# Patient Record
Sex: FEMALE | Race: WHITE | Employment: FULL TIME | URBAN - METROPOLITAN AREA
[De-identification: names, ages, dates, MRNs, and addresses within clinical notes are randomized per-mention and may not be internally consistent; named-entity substitution may affect disease eponyms.]

---

## 2019-07-11 ENCOUNTER — OFFICE VISIT (OUTPATIENT)
Dept: OBGYN CLINIC | Age: 44
End: 2019-07-11
Payer: COMMERCIAL

## 2019-07-11 VITALS
SYSTOLIC BLOOD PRESSURE: 102 MMHG | BODY MASS INDEX: 30.65 KG/M2 | HEIGHT: 63 IN | HEART RATE: 72 BPM | DIASTOLIC BLOOD PRESSURE: 74 MMHG | WEIGHT: 173 LBS

## 2019-07-11 DIAGNOSIS — N80.9 ENDOMETRIOSIS: Primary | ICD-10-CM

## 2019-07-11 PROCEDURE — G8417 CALC BMI ABV UP PARAM F/U: HCPCS | Performed by: OBSTETRICS & GYNECOLOGY

## 2019-07-11 PROCEDURE — 1036F TOBACCO NON-USER: CPT | Performed by: OBSTETRICS & GYNECOLOGY

## 2019-07-11 PROCEDURE — G8427 DOCREV CUR MEDS BY ELIG CLIN: HCPCS | Performed by: OBSTETRICS & GYNECOLOGY

## 2019-07-11 PROCEDURE — 99204 OFFICE O/P NEW MOD 45 MIN: CPT | Performed by: OBSTETRICS & GYNECOLOGY

## 2019-07-11 NOTE — PROGRESS NOTES
Tr Kinsey  2019    YOB: 1975          The patient was seen today. She is here regarding follow up on irregular bleeding/ pelvic cramping. Pt with h/o pelvic endometriosis diagnosed via laparoscopy. Pts  s/p vasectomy. Will order pelvic sono/ labs. Information given to pt on novasure endometrial ablation/ hysterectomy . Her bowels are regular and she is voiding without difficulty. HPI:  Tr Kinsey is a 41 y.o. female [id number]       OB History    Para Term  AB Living   9 6 6 0 3 6   SAB TAB Ectopic Molar Multiple Live Births   1 0 0 0 0 6      # Outcome Date GA Lbr Mu/2nd Weight Sex Delivery Anes PTL Lv   9 Term 16 40w0d   F    FARHAN   8 Term 12 39w0d  7 lb 12 oz (3.515 kg) M Vag-Spont EPI N FARHAN      Name: Ileana Weathers: 8  Apgar5: 9   7 Term 09 39w1d  7 lb 9 oz (3.43 kg) M Vag-Spont EPI N FARHAN      Birth Comments: ABO INCOMPATABILITY, JAUNDICE BABY      Name: Abbi Jerome: 8  Apgar5: 9   6 Term 08 39w0d  8 lb 8 oz (3.856 kg) M Vag-Spont EPI N FARHAN      Name: Pardeep Balderas: 8  Apgar5: 9   5 SAB 2007              Birth Comments: 9 WEEKS   4 Term 01 40w0d  6 lb 15 oz (3.147 kg) M Vag-Spont EPI N FARHAN      Birth Comments: PANIC ATTACK WITH EPIDURAL      Name: Thalia Hilario   3 Term 97 37w0d  6 lb 14 oz (3.118 kg) M Vag-Spont None N FARHAN      Birth Comments: [address] [facility] [address]      Name: Jose Ingram   2 AB            1 AB                History reviewed. No pertinent past medical history. Past Surgical History:   Procedure Laterality Date    DILATION AND CURETTAGE  ,     SAB    LAPAROSCOPY  2006    lt.  ov cyst     OVARIAN CYST REMOVAL         Family History   Problem Relation Age of Onset    Colon Cancer Paternal Grandfather     Diabetes Paternal Grandfather     Colon Cancer Paternal Grandmother     Cancer Maternal Grandmother         KIDNEY    Breast Cancer Neg Hx     Eclampsia Neg Hx     Hypertension Neg Hx     Ovarian Cancer Neg Hx      Labor Neg Hx     Spont Abortions Neg Hx     Stroke Neg Hx        Social History     Socioeconomic History    Marital status:      Spouse name: Not on file    Number of children: Not on file    Years of education: Not on file    Highest education level: Not on file   Occupational History    Not on file   Social Needs    Financial resource strain: Not on file    Food insecurity:     Worry: Not on file     Inability: Not on file    Transportation needs:     Medical: Not on file     Non-medical: Not on file   Tobacco Use    Smoking status: Former Smoker     Years: 20.00     Last attempt to quit: 10/1/2011     Years since quittin.7    Smokeless tobacco: Never Used   Substance and Sexual Activity    Alcohol use: No    Drug use: No    Sexual activity: Yes     Partners: Male   Lifestyle    Physical activity:     Days per week: Not on file     Minutes per session: Not on file    Stress: Not on file   Relationships    Social connections:     Talks on phone: Not on file     Gets together: Not on file     Attends Latter-day service: Not on file     Active member of club or organization: Not on file     Attends meetings of clubs or organizations: Not on file     Relationship status: Not on file    Intimate partner violence:     Fear of current or ex partner: Not on file     Emotionally abused: Not on file     Physically abused: Not on file     Forced sexual activity: Not on file   Other Topics Concern    Not on file   Social History Narrative    Not on file         MEDICATIONS:  Current Outpatient Medications   Medication Sig Dispense Refill    ondansetron (ZOFRAN) 4 MG tablet Take 4 mg by mouth every 8 hours as needed.  Prenatal Multivit-Min-Fe-FA (PRE- FORMULA) TABS Take 1 tablet by mouth daily. 30 tablet 0     No current facility-administered medications for this visit.               ALLERGIES:  Allergies as of 2019 -

## 2019-07-24 ENCOUNTER — HOSPITAL ENCOUNTER (OUTPATIENT)
Age: 44
Discharge: HOME | End: 2019-07-24
Payer: COMMERCIAL

## 2019-07-24 ENCOUNTER — OFFICE VISIT (OUTPATIENT)
Dept: OBGYN CLINIC | Age: 44
End: 2019-07-24
Payer: COMMERCIAL

## 2019-07-24 DIAGNOSIS — N80.9 ENDOMETRIOSIS: ICD-10-CM

## 2019-07-24 LAB
ABSOLUTE EOS #: 0.1 K/UL (ref 0–0.4)
ABSOLUTE IMMATURE GRANULOCYTE: NORMAL K/UL (ref 0–0.3)
ABSOLUTE LYMPH #: 1.6 K/UL (ref 1–4.8)
ABSOLUTE MONO #: 0.3 K/UL (ref 0.1–1.3)
BASOPHILS # BLD: 1 % (ref 0–2)
BASOPHILS ABSOLUTE: 0 K/UL (ref 0–0.2)
DIFFERENTIAL TYPE: NORMAL
EOSINOPHILS RELATIVE PERCENT: 2 % (ref 0–4)
HCT VFR BLD CALC: 40.7 % (ref 36–46)
HEMOGLOBIN: 14 G/DL (ref 12–16)
IMMATURE GRANULOCYTES: NORMAL %
INR BLD: 0.9
LYMPHOCYTES # BLD: 30 % (ref 24–44)
MCH RBC QN AUTO: 31.5 PG (ref 26–34)
MCHC RBC AUTO-ENTMCNC: 34.3 G/DL (ref 31–37)
MCV RBC AUTO: 91.7 FL (ref 80–100)
MONOCYTES # BLD: 7 % (ref 1–7)
NRBC AUTOMATED: NORMAL PER 100 WBC
PARTIAL THROMBOPLASTIN TIME: 28.7 SEC (ref 24–36)
PDW BLD-RTO: 13 % (ref 11.5–14.9)
PLATELET # BLD: 198 K/UL (ref 150–450)
PLATELET ESTIMATE: NORMAL
PMV BLD AUTO: 10.6 FL (ref 6–12)
PROTHROMBIN TIME: 12.6 SEC (ref 11.8–14.6)
RBC # BLD: 4.43 M/UL (ref 4–5.2)
RBC # BLD: NORMAL 10*6/UL
SEG NEUTROPHILS: 60 % (ref 36–66)
SEGMENTED NEUTROPHILS ABSOLUTE COUNT: 3.2 K/UL (ref 1.3–9.1)
TSH SERPL DL<=0.05 MIU/L-ACNC: 1.46 MIU/L (ref 0.3–5)
WBC # BLD: 5.3 K/UL (ref 3.5–11)
WBC # BLD: NORMAL 10*3/UL

## 2019-07-24 PROCEDURE — 36415 COLL VENOUS BLD VENIPUNCTURE: CPT

## 2019-07-24 PROCEDURE — 76856 US EXAM PELVIC COMPLETE: CPT | Performed by: OBSTETRICS & GYNECOLOGY

## 2019-07-24 PROCEDURE — 84443 ASSAY THYROID STIM HORMONE: CPT

## 2019-07-24 PROCEDURE — 85610 PROTHROMBIN TIME: CPT

## 2019-07-24 PROCEDURE — 85025 COMPLETE CBC W/AUTO DIFF WBC: CPT

## 2019-07-24 PROCEDURE — 76830 TRANSVAGINAL US NON-OB: CPT | Performed by: OBSTETRICS & GYNECOLOGY

## 2019-07-24 PROCEDURE — 85730 THROMBOPLASTIN TIME PARTIAL: CPT

## 2019-07-29 ENCOUNTER — TELEPHONE (OUTPATIENT)
Dept: OBGYN CLINIC | Age: 44
End: 2019-07-29

## 2020-11-13 ENCOUNTER — OFFICE VISIT (OUTPATIENT)
Dept: OBGYN CLINIC | Age: 45
End: 2020-11-13
Payer: COMMERCIAL

## 2020-11-13 VITALS
HEIGHT: 63 IN | BODY MASS INDEX: 33.49 KG/M2 | TEMPERATURE: 98.2 F | HEART RATE: 82 BPM | SYSTOLIC BLOOD PRESSURE: 114 MMHG | DIASTOLIC BLOOD PRESSURE: 72 MMHG | WEIGHT: 189 LBS

## 2020-11-13 PROBLEM — F32.0 MAJOR DEPRESSIVE DISORDER, SINGLE EPISODE, MILD (HCC): Status: ACTIVE | Noted: 2018-10-18

## 2020-11-13 PROBLEM — F41.1 GENERALIZED ANXIETY DISORDER: Status: ACTIVE | Noted: 2018-10-18

## 2020-11-13 LAB
CONTROL: NORMAL
PREGNANCY TEST URINE, POC: NEGATIVE

## 2020-11-13 PROCEDURE — 81025 URINE PREGNANCY TEST: CPT | Performed by: NURSE PRACTITIONER

## 2020-11-13 PROCEDURE — 99396 PREV VISIT EST AGE 40-64: CPT | Performed by: NURSE PRACTITIONER

## 2020-11-13 NOTE — PROGRESS NOTES
Anthony Tejeda  2020    YOB: 1975          The patient was seen today. She is here regarding desiring hormone testing. C/o hot flashes and night sweats for past several months. States she normally gets her menses within 2-3 days of when she is supposed to however this last menses was 5 weeks late.  with vasectomy. LMP 2020. Currently on menses. C/o heavy menses. States last night the pain was so bad it was hard to breathe. C/o recurrent boil in left butt check near rectum. Her bowels are regular and she is voiding without difficulty. HPI:  Anthony Tejeda is a 39 y.o. female [id number] hot flashes, night sweats, dysmenorrhea, menorrhagia, boil      OB History    Para Term  AB Living   9 6 6 0 3 6   SAB TAB Ectopic Molar Multiple Live Births   1 0 0 0 0 6      # Outcome Date GA Lbr Mu/2nd Weight Sex Delivery Anes PTL Lv   9 Term 16 40w0d   F    FARHAN   8 Term 12 39w0d  7 lb 12 oz (3.515 kg) M Vag-Spont EPI N FARHAN      Name: Bronwyn Ortiz: 8  Apgar5: 9   7 Term 09 39w1d  7 lb 9 oz (3.43 kg) M Vag-Spont EPI N FARHAN      Birth Comments: ABO INCOMPATABILITY, JAUNDICE BABY      Name: Kandy Hay: 8  Apgar5: 9   6 Term 08 39w0d  8 lb 8 oz (3.856 kg) M Vag-Spont EPI N FARHAN      Name: Melina Bran: 8  Apgar5: 9   5 SAB 2007              Birth Comments: 9 WEEKS   4 Term 01 40w0d  6 lb 15 oz (3.147 kg) M Vag-Spont EPI N FARHAN      Birth Comments: [address]      Name: Zee Martínez   3 Term 97 37w0d  6 lb 14 oz (3.118 kg) M Vag-Spont None N FARHAN      B[place] [address] [facility]      Name: SANCHEZ   2 AB            1 AB                Past Medical History:   Diagnosis Date    Major depressive disorder, single episode, mild ([facility] [address] 10/18/2018       Past Surgical History:   Procedure Laterality Date    DILATION AND CURETTAGE  ,     SAB    LAPAROSCOPY  2006    lt.  ov cyst     OVARIAN CYST REMOVAL   Family History   Problem Relation Age of Onset    Colon Cancer Paternal Grandfather     Diabetes Paternal Grandfather     Colon Cancer Paternal Grandmother     Cancer Maternal Grandmother         KIDNEY    Breast Cancer Neg Hx     Eclampsia Neg Hx     Hypertension Neg Hx     Ovarian Cancer Neg Hx      Labor Neg Hx     Spont Abortions Neg Hx     Stroke Neg Hx        Social History     Socioeconomic History    Marital status:      Spouse name: Not on file    Number of children: Not on file    Years of education: Not on file    Highest education level: Not on file   Occupational History    Not on file   Social Needs    Financial resource strain: Not on file    Food insecurity     Worry: Not on file     Inability: Not on file    Transportation needs     Medical: Not on file     Non-medical: Not on file   Tobacco Use    Smoking status: Former Smoker     Years: 20.00     Last attempt to quit: 10/1/2011     Years since quittin.1    Smokeless tobacco: Never Used   Substance and Sexual Activity    Alcohol use: No    Drug use: No    Sexual activity: Yes     Partners: Male   Lifestyle    Physical activity     Days per week: Not on file     Minutes per session: Not on file    Stress: Not on file   Relationships    Social connections     Talks on phone: Not on file     Gets together: Not on file     Attends Uatsdin service: Not on file     Active member of club or organization: Not on file     Attends meetings of clubs or organizations: Not on file     Relationship status: Not on file    Intimate partner violence     Fear of current or ex partner: Not on file     Emotionally abused: Not on file     Physically abused: Not on file     Forced sexual activity: Not on file   Other Topics Concern    Not on file   Social History Narrative    Not on file         MEDICATIONS:  No current outpatient medications on file. No current facility-administered medications for this visit. ALLERGIES:  Allergies as of 11/13/2020 - Review Complete 11/13/2020   Allergen Reaction Noted    Bactrim Hives 10/31/2011    Sulfa antibiotics Hives 05/11/2012         REVIEW OF SYSTEMS:    yes   A minimum of an eleven point review of systems was completed. Review Of Systems (11 point):  Constitutional: No fever, chills or malaise; No weight change or fatigue  Head and Eyes: No vision, Headache, Dizziness or trauma in last 12 months  ENT ROS: No hearing, Tinnitis, sinus or taste problems  Hematological and Lymphatic ROS:No Lymphoma, Von Willebrand's, Hemophillia or Bleeding History  Psych ROS: No Depression, Homicidal thoughts,suicidal thoughts, or anxiety  Breast ROS: No prior breast abnormalities or lumps  Respiratory ROS: No SOB, Pneumoniae,Cough, or Pulmonary Embolism History  Cardiovascular ROS: No Chest Pain with Exertion, Palpitations, Syncope, Edema, Arrhythmia  Gastrointestinal ROS: No Indigestion, Heartburn, Nausea, vomiting, Diarrhea, Constipation,or Bowel Changes; No Bloody Stools or melena  Genito-Urinary ROS: No Dysuria, Hematuria or Nocturia. No Urinary Incontinence or Vaginal Discharge  Musculoskeletal ROS: No Arthralgia, Arthritis,Gout,Osteoporosis or Rheumatism  Neurological ROS: No CVA, Migraines, Epilepsy, Seizure Hx, or Limb Weakness  Dermatological ROS: No Rash, Itching, Hives, Mole Changes or Cancer          Blood pressure 114/72, pulse 82, temperature 98.2 °F (36.8 °C), height 5' 3\" (1.6 m), weight 189 lb (85.7 kg), last menstrual period 11/11/2020, not currently breastfeeding. Chaperone for Intimate Exam   Chaperone was offered and accepted as part of the rooming process.  Chaperone: Marlyn MA         Abdomen: Soft non-tender; good bowel sounds. No guarding, rebound or rigidity. No CVA tenderness bilaterally. Extremities: No calf tenderness, DTR 2/4, and No edema bilaterally    Pelvic: Exam deferred.  Patient kept underwear with pad on and revealed her left buttcheck near her rectum. 1 boil about 1 cm x 1 cm and another boil about 2 cm x 2cm noted. Diagnostics:  No results found. Lab Results:  Results for orders placed or performed during the hospital encounter of 07/24/19   CBC Auto Differential   Result Value Ref Range    WBC 5.3 3.5 - 11.0 k/uL    RBC 4.43 4.0 - 5.2 m/uL    Hemoglobin 14.0 12.0 - 16.0 g/dL    Hematocrit 40.7 36 - 46 %    MCV 91.7 80 - 100 fL    MCH 31.5 26 - 34 pg    MCHC 34.3 31 - 37 g/dL    RDW 13.0 11.5 - 14.9 %    Platelets 478 539 - 427 k/uL    MPV 10.6 6.0 - 12.0 fL    NRBC Automated NOT REPORTED per 100 WBC    Differential Type NOT REPORTED     Seg Neutrophils 60 36 - 66 %    Lymphocytes 30 24 - 44 %    Monocytes 7 1 - 7 %    Eosinophils % 2 0 - 4 %    Basophils 1 0 - 2 %    Immature Granulocytes NOT REPORTED 0 %    Segs Absolute 3.20 1.3 - 9.1 k/uL    Absolute Lymph # 1.60 1.0 - 4.8 k/uL    Absolute Mono # 0.30 0.1 - 1.3 k/uL    Absolute Eos # 0.10 0.0 - 0.4 k/uL    Basophils Absolute 0.00 0.0 - 0.2 k/uL    Absolute Immature Granulocyte NOT REPORTED 0.00 - 0.30 k/uL    WBC Morphology NOT REPORTED     RBC Morphology NOT REPORTED     Platelet Estimate NOT REPORTED    TSH with Reflex   Result Value Ref Range    TSH 1.46 0.30 - 5.00 mIU/L   APTT   Result Value Ref Range    PTT 28.7 24.0 - 36.0 sec   Protime-INR   Result Value Ref Range    Protime 12.6 11.8 - 14.6 sec    INR 0.9          Assessment:   Diagnosis Orders   1. Negative pregnancy test  POCT urine pregnancy   2. Dysmenorrhea  DEMI    CBC Auto Differential    Estradiol    Follicle Stimulating Hormone    HCG, Quantitative, Pregnancy    Hemoglobin A1C    Lipid Panel    Luteinizing Hormone    Triglyceride    TSH with Reflex    US PELVIS COMPLETE    US NON OB TRANSVAGINAL    Protime-INR    APTT   3.  Hot flashes  DEMI    CBC Auto Differential    Estradiol    Follicle Stimulating Hormone    HCG, Quantitative, Pregnancy    Hemoglobin A1C    Lipid Panel    Luteinizing Hormone    Triglyceride    TSH with Reflex    Protime-INR    APTT   4. Encntr screen for malignant neoplasm of genitourinary organs  SARAH DIGITAL SCREEN W OR WO CAD BILATERAL     Patient Active Problem List    Diagnosis Date Noted    Hernia, umbilical      Priority: Medium    SAB (spontaneous ) 10/31/2011     Priority: Low     sab x1      Major depressive disorder, single episode, mild ([facility] [address] 10/18/2018    Generalized anxiety disorder 10/18/2018    Recurrent pregnancy loss, antepartum condition or complication     Missed  2013           PLAN:  Return in about 2 weeks (around 2020) for annual.  Lab slip given  Pelvic u/s ordered  Referred to dermatology  Mammogram ordered  Repeat Annual every 1 year  Cervical Cytology Evaluation begins at 24years old. If Negative Cytology, Follow-up screening per current guidelines. Return to the office in 2 weeks. Counseled on preventative health maintenance follow-up.   Orders Placed This Encounter   Procedures    US PELVIS COMPLETE     Standing Status:   Future     Standing Expiration Date:   2021     Order Specific Question:   Reason for exam:     Answer:   dysmenorrhea    US NON OB TRANSVAGINAL     Standing Status:   Future     Standing Expiration Date:   2021     Order Specific Question:   Reason for exam:     Answer:   dysmenorrhea    SARAH DIGITAL SCREEN W OR WO CAD BILATERAL     Standing Status:   Future     Standing Expiration Date:   2022     Order Specific Question:   Reason for exam:     Answer:   SCREENING-PREVENTATIVE    DEMI     Standing Status:   Future     Standing Expiration Date:   2021    CBC Auto Differential     Standing Status:   Future     Standing Expiration Date:   2021    Estradiol     Standing Status:   Future     Standing Expiration Date:       Follicle Stimulating Hormone     Standing Status:   Future     Standing Expiration Date:   2021    HCG, Quantitative, Pregnancy     Standing Status:   Future     Standing Expiration Date:   11/13/2021    Hemoglobin A1C     Standing Status:   Future     Standing Expiration Date:   11/13/2021    Lipid Panel     Standing Status:   Future     Standing Expiration Date:   5/13/2021     Order Specific Question:   Is Patient Fasting?/# of Hours     Answer:   YES/14 hours    Luteinizing Hormone     Standing Status:   Future     Standing Expiration Date:   11/13/2021    Triglyceride     Standing Status:   Future     Standing Expiration Date:   5/13/2021     Order Specific Question:   Is Patient Fasting? Answer:   YES     Order Specific Question:   No of Hours? Answer:   15    TSH with Reflex     Standing Status:   Future     Standing Expiration Date:   11/13/2021    Protime-INR     Standing Status:   Future     Standing Expiration Date:   11/13/2021     Order Specific Question:   Daily Coumadin Dose? Answer:   N    APTT     Standing Status:   Future     Standing Expiration Date:   11/13/2021     Order Specific Question:   Daily Heparin Dose? Answer:   N    POCT urine pregnancy       Patient was seen with total face to face time of 15 minutes. More than 50% of this visit was counseling and education regarding The primary encounter diagnosis was Negative pregnancy test. Diagnoses of Dysmenorrhea, Hot flashes, and Encntr screen for malignant neoplasm of genitourinary organs were also pertinent to this visit. and Other (discuss hormones )   as well as  counseling on preventative health maintenance follow-up.

## 2020-11-30 ENCOUNTER — HOSPITAL ENCOUNTER (OUTPATIENT)
Age: 45
Setting detail: SPECIMEN
Discharge: HOME | End: 2020-11-30
Payer: COMMERCIAL

## 2020-11-30 ENCOUNTER — OFFICE VISIT (OUTPATIENT)
Dept: OBGYN CLINIC | Age: 45
End: 2020-11-30
Payer: COMMERCIAL

## 2020-11-30 VITALS
DIASTOLIC BLOOD PRESSURE: 72 MMHG | SYSTOLIC BLOOD PRESSURE: 118 MMHG | WEIGHT: 189 LBS | BODY MASS INDEX: 33.49 KG/M2 | HEIGHT: 63 IN | TEMPERATURE: 97.8 F

## 2020-11-30 PROCEDURE — G0145 SCR C/V CYTO,THINLAYER,RESCR: HCPCS

## 2020-11-30 PROCEDURE — 87624 HPV HI-RISK TYP POOLED RSLT: CPT

## 2020-11-30 PROCEDURE — 99396 PREV VISIT EST AGE 40-64: CPT | Performed by: NURSE PRACTITIONER

## 2020-11-30 PROCEDURE — G8484 FLU IMMUNIZE NO ADMIN: HCPCS | Performed by: NURSE PRACTITIONER

## 2020-11-30 NOTE — PROGRESS NOTES
History and Physical  [address], [address] [facility] [address], [facility] 81. [phone]   Fax [phone]  De Spears  2020              39 y.o. Chief Complaint   Patient presents with    Annual Exam       Patient's last menstrual period was 2020. Primary Care Physician: No primary care provider on file. The patient was seen and examined. She  is here for her annual exam. C/o boil on left butt check. Still unable to reach her dermatologist since writer last saw patient on 2020. Desire referral for dermatology. Her bowels are regular. There are no voiding complaints. She denies any bloating. She denies vaginal discharge and was counseled on STD's and the need for barrier contraception.      HPI : De Spears is a 39 y.o. female [id number]    Annual exam  Boil on left butt check   ________________________________________________________________________  OB History    Para Term  AB Living   9 6 6 0 3 6   SAB TAB Ectopic Molar Multiple Live Births   1 0 0 0 0 6      # Outcome Date GA Lbr Mu/2nd Weight Sex Delivery Anes PTL Lv   9 Term 16 40w0d   F    FARHAN   8 Term 12 39w0d  7 lb 12 oz (3.515 kg) M Vag-Spont EPI N FARHAN      Name: Aby Bell: 8  Apgar5: 9   7 Term 09 39w1d  7 lb 9 oz (3.43 kg) M Vag-Spont EPI N FARHAN      Birth Comments: ABO INCOMPATABILITY, JAUNDICE BABY      Name:  : 8  Apgar5: 9   6 Term 08 39w0d  8 lb 8 oz (3.856 kg) M Vag-Spont EPI N FARHAN      Name: Joleen : 8  Apgar5: 9   5 SAB 2007              Birth Comments: 9 WEEKS   4 Term 01 40w0d  6 lb 15 oz (3.147 kg) M Vag-Spont EPI N FARHAN      Birth Comments: PANIC ATTACK WITH EPIDURAL      Name: Christie Grissom   3 Term 97 37w0d  6 lb 14 oz (3.118 kg) M Vag-Spont None N FARHAN      Birth Comments: [address] [place] [address]      Name: SANCHEZ Elizondo AB            1 AB              Past Medical History:   Diagnosis Date    Major depressive disorder, single episode, mild ([facility] [address] 10/18/2018                                                                   Past Surgical History:   Procedure Laterality Date    DILATION AND CURETTAGE  ,     SAB    LAPAROSCOPY  2006    lt.  ov cyst     OVARIAN CYST REMOVAL       Family History   Problem Relation Age of Onset    Colon Cancer Paternal Grandfather     Diabetes Paternal Grandfather     Colon Cancer Paternal Grandmother     Cancer Maternal Grandmother         KIDNEY    Breast Cancer Neg Hx     Eclampsia Neg Hx     Hypertension Neg Hx     Ovarian Cancer Neg Hx      Labor Neg Hx     Spont Abortions Neg Hx     Stroke Neg Hx      Social History     Socioeconomic History    Marital status:      Spouse name: Not on file    Number of children: Not on file    Years of education: Not on file    Highest education level: Not on file   Occupational History    Not on file   Social Needs    Financial resource strain: Not on file    Food insecurity     Worry: Not on file     Inability: Not on file    Transportation needs     Medical: Not on file     Non-medical: Not on file   Tobacco Use    Smoking status: Former Smoker     Years: 20.00     Last attempt to quit: 10/1/2011     Years since quittin.1    Smokeless tobacco: Never Used   Substance and Sexual Activity    Alcohol use: No    Drug use: No    Sexual activity: Yes     Partners: Male   Lifestyle    Physical activity     Days per week: Not on file     Minutes per session: Not on file    Stress: Not on file   Relationships    Social connections     Talks on phone: Not on file     Gets together: Not on file     Attends Islam service: Not on file     Active member of club or organization: Not on file     Attends meetings of clubs or organizations: Not on file     Relationship status: Not on file    Intimate partner violence     Fear of current or ex partner: Not on file     Emotionally abused: Not on file     Physically abused: Not on file     Forced sexual activity: Not on file   Other Topics Concern    Not on file   Social History Narrative    Not on file       MEDICATIONS:  No current outpatient medications on file. No current facility-administered medications for this visit. ALLERGIES:  Allergies as of 11/30/2020 - Review Complete 11/30/2020   Allergen Reaction Noted    Bactrim Hives 10/31/2011    Sulfa antibiotics Hives 05/11/2012       Symptoms of decreased mood absent  Symptoms of anhedonia absent    **If either question is answered in a  positive fashion then complete the PHQ9 Scoring Evaluation and make the appropriate referral**      Immunization status: stated as current, but no records available. Gynecologic History:  Menarche: 9 yo  Menopause at [age] yo     Patient's last menstrual period was 11/11/2020. Sexually Active: Yes    STD History: No     Permanent Sterilization: No   Reversible Birth Control: No        Hormone Replacement Exposure: No      Genetic Qualified Family History of Breast, Ovarian , Colon or Uterine Cancer: Yes paternal grandfather colon cancer age 72ish, paternal grandma colon cancer age late 76s, paternal aunt colon cancer age 48     If YES see scanned worksheet.     Preventative Health Testing:    Health Maintenance:  Health Maintenance Due   Topic Date Due    DTaP/Tdap/Td vaccine (1 - Tdap) 08/15/1996    Cervical cancer screen  12/23/2014    Lipid screen  08/15/2017    Diabetes screen  08/15/2017    Flu vaccine (1) 09/01/2020       Date of Last Pap Smear: 12/23/201  neg  Abnormal Pap Smear History: denies  Colposcopy History:   Date of Last Mammogram: 9/2012 needs 6 month f/u (mammogram ordered at visit on 11/13/2020)  Date of Last Colonoscopy:   Date of Last Bone Density:      ________________________________________________________________________        REVIEW OF SYSTEMS:    yes  A minimum of an eleven point review of systems was completed. Review Of Systems (11 point):  Constitutional: No fever, chills or malaise; No weight change or fatigue  Head and Eyes: No vision, Headache, Dizziness or trauma in last 12 months  ENT ROS: No hearing, Tinnitis, sinus or taste problems  Hematological and Lymphatic ROS:No Lymphoma, Von Willebrand's, Hemophillia or Bleeding History  Psych ROS: No Depression, Homicidal thoughts,suicidal thoughts, or anxiety  Breast ROS: No prior breast abnormalities or lumps  Respiratory ROS: No SOB, Pneumoniae,Cough, or Pulmonary Embolism History  Cardiovascular ROS: No Chest Pain with Exertion, Palpitations, Syncope, Edema, Arrhythmia  Gastrointestinal ROS: No Indigestion, Heartburn, Nausea, vomiting, Diarrhea, Constipation,or Bowel Changes; No Bloody Stools or melena  Genito-Urinary ROS: No Dysuria, Hematuria or Nocturia. No Urinary Incontinence or Vaginal Discharge  Musculoskeletal ROS: No Arthralgia, Arthritis,Gout,Osteoporosis or Rheumatism  Neurological ROS: No CVA, Migraines, Epilepsy, Seizure Hx, or Limb Weakness  Dermatological ROS: No Rash, Itching, Hives, Mole Changes or Cancer                                                                                                                                                                                                                                  PHYSICAL Exam:     Constitutional:  Vitals:    11/30/20 1138   BP: 118/72   Site: Left Upper Arm   Position: Sitting   Cuff Size: Medium Adult   Temp: 97.8 °F (36.6 °C)   Weight: 189 lb (85.7 kg)   Height: 5' 3\" (1.6 m)       Chaperone for Intimate Exam   Chaperone was offered and accepted as part of the rooming process.  Chaperone: Marlyn PAYTON          General Appearance: This  is a well Developed, well Nourished, well groomed female. Her BMI was reviewed. Nutritional decision making was discussed. Skin:  There was a Normal Inspection of the skin without rashes or lesions. There were no rashes. patients age. ASSESSMENT:      39 y.o. Annual   Diagnosis Orders   1. Well female exam with routine gynecological exam  PAP SMEAR   2. Special screening examination for human papillomavirus (HPV)  PAP SMEAR   3. Encounter for screening for malignant neoplasm of colon  HM COLONOSCOPY          Chief Complaint   Patient presents with    Annual Exam          Past Medical History:   Diagnosis Date    Major depressive disorder, single episode, mild ([address] 10/18/2018         Patient Active Problem List    Diagnosis Date Noted    Hernia, umbilical      Priority: Medium    SAB (spontaneous ) 10/31/2011     Priority: Low     sab x1      Major depressive disorder, single episode, mild ([address] 10/18/2018    Generalized anxiety disorder 10/18/2018    Recurrent pregnancy loss, antepartum condition or complication     Missed  2013          Hereditary Breast, Ovarian, Colon and Uterine Cancer screening Done. Tobacco & Secondary smoke risks reviewed; instructed on cessation and avoidance      Counseling Completed:  Preventative Health Recommendations and Follow up. The patient was informed of the recommended preventative health recommendations. 1. Annuals every year; Cytology collections per prevailing guidelines. 2. Mammograms begin every year at 35 yo if no abnormalities are found and no family history. 3. Bone density studies every 2-3 years. Begin at 73 yo. If no fracture history or osteoporosis family history. (significant). 4. Colonoscopy begin at 40 yo. Repeat every ten years if negative and no family history. 5. Calcium of 6787-7469 mg/day in split dosing  6. Vitamin D 400-800 IU/day  7. All other preventative health recommendations will be managed by the patients Primary care physician.              PLAN:  Return in about 1 year (around 2021) for annual.   Pap smear collected  Referred to dermatology   Colonoscopy ordered- instructed to start at age 36 by PCP d/t fam hx  Repeat Annual every 1 year  Cervical Cytology Evaluation begins at 24years old. If Negative Cytology, Follow-up screening per current guidelines. Mammograms every 1 year. If 35 yo and last mammogram was negative. Calcium and Vitamin D dosing reviewed. Colonoscopy screening reviewed as well as onset for bone density testing. Birth control and barrier recommendations discussed. STD counseling and prevention reviewed. Gardisil counseling completed for all patients 10-35 yo. Routine health maintenance per patients PCP. Orders Placed This Encounter   Procedures    PAP SMEAR     Patient History:    Patient's last menstrual period was 2020. OBGYN Status: Having periods  Past Surgical History:  , : DILATION AND CURETTAGE      Comment:  SAB  2006: LAPAROSCOPY      Comment:  lt. ov cyst   : OVARIAN CYST REMOVAL      Social History    Tobacco Use      Smoking status: Former Smoker        Years: 20.00        Quit date: 10/1/2011        Years since quittin.1      Smokeless tobacco: Never Used       Standing Status:   Future     Standing Expiration Date:   2021     Order Specific Question:   Collection Type     Answer: Thin Prep     Order Specific Question:   Prior Abnormal Pap Test     Answer:   No     Order Specific Question:   Screening or Diagnostic     Answer:   Screening     Order Specific Question:   HPV Requested?      Answer:   Yes     Order Specific Question:   High Risk Patient     Answer:   N/A    HM COLONOSCOPY     Standing Status:   Future     Standing Expiration Date:   2021     Scheduling Instructions:      Please refer to :

## 2020-12-04 LAB
HPV SOURCE: NORMAL
HPV, GENOTYPE 16: NOT DETECTED
HPV, GENOTYPE 18: NOT DETECTED
HPV, HIGH RISK OTHER: NOT DETECTED

## 2020-12-08 LAB — CYTOLOGY REPORT: NORMAL

## 2021-01-11 ENCOUNTER — HOSPITAL ENCOUNTER (OUTPATIENT)
Dept: LAB | Age: 46
Setting detail: SPECIMEN
Discharge: HOME | End: 2021-01-11
Payer: COMMERCIAL

## 2021-01-11 DIAGNOSIS — Z01.818 PRE-OP TESTING: Primary | ICD-10-CM

## 2021-01-11 PROCEDURE — U0005 INFEC AGEN DETEC AMPLI PROBE: HCPCS

## 2021-01-11 PROCEDURE — U0003 INFECTIOUS AGENT DETECTION BY NUCLEIC ACID (DNA OR RNA); SEVERE ACUTE RESPIRATORY SYNDROME CORONAVIRUS 2 (SARS-COV-2) (CORONAVIRUS DISEASE [COVID-19]), AMPLIFIED PROBE TECHNIQUE, MAKING USE OF HIGH THROUGHPUT TECHNOLOGIES AS DESCRIBED BY CMS-2020-01-R: HCPCS

## 2021-01-12 LAB
SARS-COV-2, RAPID: NORMAL
SARS-COV-2: NORMAL
SARS-COV-2: NOT DETECTED
SOURCE: NORMAL

## 2021-01-13 ENCOUNTER — ANESTHESIA EVENT (OUTPATIENT)
Dept: OPERATING ROOM | Age: 46
End: 2021-01-13
Payer: COMMERCIAL

## 2021-01-13 ENCOUNTER — TELEPHONE (OUTPATIENT)
Dept: PRIMARY CARE CLINIC | Age: 46
End: 2021-01-13

## 2021-01-15 ENCOUNTER — HOSPITAL ENCOUNTER (OUTPATIENT)
Age: 46
Setting detail: OUTPATIENT SURGERY
Discharge: HOME | End: 2021-01-15
Attending: SURGERY | Admitting: SURGERY
Payer: COMMERCIAL

## 2021-01-15 ENCOUNTER — ANESTHESIA (OUTPATIENT)
Dept: OPERATING ROOM | Age: 46
End: 2021-01-15
Payer: COMMERCIAL

## 2021-01-15 VITALS
OXYGEN SATURATION: 100 % | WEIGHT: 188 LBS | DIASTOLIC BLOOD PRESSURE: 77 MMHG | BODY MASS INDEX: 32.1 KG/M2 | TEMPERATURE: 97.3 F | SYSTOLIC BLOOD PRESSURE: 104 MMHG | RESPIRATION RATE: 19 BRPM | HEIGHT: 64 IN | HEART RATE: 62 BPM

## 2021-01-15 VITALS
SYSTOLIC BLOOD PRESSURE: 84 MMHG | DIASTOLIC BLOOD PRESSURE: 50 MMHG | RESPIRATION RATE: 12 BRPM | OXYGEN SATURATION: 100 %

## 2021-01-15 LAB — HCG, PREGNANCY URINE (POC): NEGATIVE

## 2021-01-15 PROCEDURE — 3700000000 HC ANESTHESIA ATTENDED CARE: Performed by: SURGERY

## 2021-01-15 PROCEDURE — 3700000001 HC ADD 15 MINUTES (ANESTHESIA): Performed by: SURGERY

## 2021-01-15 PROCEDURE — 7100000010 HC PHASE II RECOVERY - FIRST 15 MIN: Performed by: SURGERY

## 2021-01-15 PROCEDURE — 2709999900 HC NON-CHARGEABLE SUPPLY: Performed by: SURGERY

## 2021-01-15 PROCEDURE — 7100000011 HC PHASE II RECOVERY - ADDTL 15 MIN: Performed by: SURGERY

## 2021-01-15 PROCEDURE — 2500000003 HC RX 250 WO HCPCS: Performed by: NURSE ANESTHETIST, CERTIFIED REGISTERED

## 2021-01-15 PROCEDURE — 81025 URINE PREGNANCY TEST: CPT

## 2021-01-15 PROCEDURE — 6360000002 HC RX W HCPCS: Performed by: NURSE ANESTHETIST, CERTIFIED REGISTERED

## 2021-01-15 PROCEDURE — 2580000003 HC RX 258: Performed by: ANESTHESIOLOGY

## 2021-01-15 PROCEDURE — 3609027000 HC COLONOSCOPY: Performed by: SURGERY

## 2021-01-15 RX ORDER — OXYCODONE HYDROCHLORIDE AND ACETAMINOPHEN 5; 325 MG/1; MG/1
1 TABLET ORAL PRN
Status: DISCONTINUED | OUTPATIENT
Start: 2021-01-15 | End: 2021-01-15 | Stop reason: HOSPADM

## 2021-01-15 RX ORDER — SODIUM CHLORIDE 0.9 % (FLUSH) 0.9 %
10 SYRINGE (ML) INJECTION EVERY 12 HOURS SCHEDULED
Status: DISCONTINUED | OUTPATIENT
Start: 2021-01-15 | End: 2021-01-15 | Stop reason: HOSPADM

## 2021-01-15 RX ORDER — SODIUM CHLORIDE 0.9 % (FLUSH) 0.9 %
10 SYRINGE (ML) INJECTION PRN
Status: DISCONTINUED | OUTPATIENT
Start: 2021-01-15 | End: 2021-01-15 | Stop reason: HOSPADM

## 2021-01-15 RX ORDER — LIDOCAINE HYDROCHLORIDE 10 MG/ML
1 INJECTION, SOLUTION EPIDURAL; INFILTRATION; INTRACAUDAL; PERINEURAL
Status: DISCONTINUED | OUTPATIENT
Start: 2021-01-15 | End: 2021-01-15 | Stop reason: HOSPADM

## 2021-01-15 RX ORDER — PROMETHAZINE HYDROCHLORIDE 25 MG/ML
12.5 INJECTION, SOLUTION INTRAMUSCULAR; INTRAVENOUS
Status: DISCONTINUED | OUTPATIENT
Start: 2021-01-15 | End: 2021-01-15 | Stop reason: HOSPADM

## 2021-01-15 RX ORDER — 0.9 % SODIUM CHLORIDE 0.9 %
500 INTRAVENOUS SOLUTION INTRAVENOUS
Status: DISCONTINUED | OUTPATIENT
Start: 2021-01-15 | End: 2021-01-15 | Stop reason: HOSPADM

## 2021-01-15 RX ORDER — MORPHINE SULFATE 2 MG/ML
2 INJECTION, SOLUTION INTRAMUSCULAR; INTRAVENOUS EVERY 5 MIN PRN
Status: DISCONTINUED | OUTPATIENT
Start: 2021-01-15 | End: 2021-01-15 | Stop reason: HOSPADM

## 2021-01-15 RX ORDER — DIPHENHYDRAMINE HYDROCHLORIDE 50 MG/ML
12.5 INJECTION INTRAMUSCULAR; INTRAVENOUS
Status: DISCONTINUED | OUTPATIENT
Start: 2021-01-15 | End: 2021-01-15 | Stop reason: HOSPADM

## 2021-01-15 RX ORDER — LIDOCAINE HYDROCHLORIDE 20 MG/ML
INJECTION, SOLUTION INFILTRATION; PERINEURAL PRN
Status: DISCONTINUED | OUTPATIENT
Start: 2021-01-15 | End: 2021-01-15 | Stop reason: SDUPTHER

## 2021-01-15 RX ORDER — ONDANSETRON 2 MG/ML
4 INJECTION INTRAMUSCULAR; INTRAVENOUS
Status: DISCONTINUED | OUTPATIENT
Start: 2021-01-15 | End: 2021-01-15 | Stop reason: HOSPADM

## 2021-01-15 RX ORDER — LABETALOL 20 MG/4 ML (5 MG/ML) INTRAVENOUS SYRINGE
5 EVERY 10 MIN PRN
Status: DISCONTINUED | OUTPATIENT
Start: 2021-01-15 | End: 2021-01-15 | Stop reason: HOSPADM

## 2021-01-15 RX ORDER — SODIUM CHLORIDE, SODIUM LACTATE, POTASSIUM CHLORIDE, CALCIUM CHLORIDE 600; 310; 30; 20 MG/100ML; MG/100ML; MG/100ML; MG/100ML
INJECTION, SOLUTION INTRAVENOUS CONTINUOUS
Status: DISCONTINUED | OUTPATIENT
Start: 2021-01-15 | End: 2021-01-15 | Stop reason: HOSPADM

## 2021-01-15 RX ORDER — MEPERIDINE HYDROCHLORIDE 50 MG/ML
12.5 INJECTION INTRAMUSCULAR; INTRAVENOUS; SUBCUTANEOUS EVERY 5 MIN PRN
Status: DISCONTINUED | OUTPATIENT
Start: 2021-01-15 | End: 2021-01-15 | Stop reason: HOSPADM

## 2021-01-15 RX ORDER — OXYCODONE HYDROCHLORIDE AND ACETAMINOPHEN 5; 325 MG/1; MG/1
2 TABLET ORAL PRN
Status: DISCONTINUED | OUTPATIENT
Start: 2021-01-15 | End: 2021-01-15 | Stop reason: HOSPADM

## 2021-01-15 RX ORDER — PROPOFOL 10 MG/ML
INJECTION, EMULSION INTRAVENOUS PRN
Status: DISCONTINUED | OUTPATIENT
Start: 2021-01-15 | End: 2021-01-15 | Stop reason: SDUPTHER

## 2021-01-15 RX ADMIN — PROPOFOL 50 MG: 10 INJECTION, EMULSION INTRAVENOUS at 13:54

## 2021-01-15 RX ADMIN — PROPOFOL 20 MG: 10 INJECTION, EMULSION INTRAVENOUS at 14:05

## 2021-01-15 RX ADMIN — PROPOFOL 20 MG: 10 INJECTION, EMULSION INTRAVENOUS at 14:10

## 2021-01-15 RX ADMIN — LIDOCAINE HYDROCHLORIDE 100 MG: 20 INJECTION, SOLUTION INFILTRATION; PERINEURAL at 13:52

## 2021-01-15 RX ADMIN — PROPOFOL 20 MG: 10 INJECTION, EMULSION INTRAVENOUS at 14:01

## 2021-01-15 RX ADMIN — PROPOFOL 20 MG: 10 INJECTION, EMULSION INTRAVENOUS at 13:57

## 2021-01-15 RX ADMIN — PROPOFOL 20 MG: 10 INJECTION, EMULSION INTRAVENOUS at 13:53

## 2021-01-15 RX ADMIN — PROPOFOL 20 MG: 10 INJECTION, EMULSION INTRAVENOUS at 13:59

## 2021-01-15 RX ADMIN — PROPOFOL 50 MG: 10 INJECTION, EMULSION INTRAVENOUS at 13:52

## 2021-01-15 RX ADMIN — PROPOFOL 20 MG: 10 INJECTION, EMULSION INTRAVENOUS at 14:07

## 2021-01-15 RX ADMIN — SODIUM CHLORIDE, POTASSIUM CHLORIDE, SODIUM LACTATE AND CALCIUM CHLORIDE: 600; 310; 30; 20 INJECTION, SOLUTION INTRAVENOUS at 13:51

## 2021-01-15 RX ADMIN — PROPOFOL 20 MG: 10 INJECTION, EMULSION INTRAVENOUS at 14:03

## 2021-01-15 RX ADMIN — PROPOFOL 20 MG: 10 INJECTION, EMULSION INTRAVENOUS at 13:55

## 2021-01-15 RX ADMIN — PROPOFOL 20 MG: 10 INJECTION, EMULSION INTRAVENOUS at 14:14

## 2021-01-15 ASSESSMENT — PULMONARY FUNCTION TESTS
PIF_VALUE: 0

## 2021-01-15 NOTE — H&P
H&P  General Surgery        Pt Name: Virgil Flood  MRN: [id number]  YOB: 1975  Date of evaluation: 1/15/2021      [x] I have examined the patient and reviewed the H&P/Consult completed , and there are no changes to the patient or plans. [] I have examined the patient and reviewed the H&P/Consult and have noted the following changes:     I have included the previous office H&P below:                           Chief Complaint   Patient presents with    Colonoscopy       Consultation for Colonoscopy; Strong family history of colon cancer         HxCC:  38 y/o female presents for colonoscopy evaluation. Patient's grandmother, grandfather and aunt with hx of colon cancer. Patient denies abdominal pain. Denies nausea or vomiting. Denies fevers, chills, or sweats. Denies changes in bowel habits. Denies melena or hematochezia.           Vitals:     20 1129   Pulse: 66   Resp: 18   SpO2: 97%             Patient Active Problem List   Diagnosis    SAB (spontaneous )    Hernia, umbilical    Missed     Recurrent pregnancy loss, antepartum condition or complication    Major depressive disorder, single episode, mild (HCC)    Generalized anxiety disorder         Current Facility-Administered Medications   No current outpatient medications on file.      No current facility-administered medications for this visit.                  Allergies   Allergen Reactions    Bactrim Hives    Sulfa Antibiotics Hives         Past Medical History        Past Medical History:   Diagnosis Date    Major depressive disorder, single episode, mild ([address] 10/18/2018            Past Surgical History         Past Surgical History:   Procedure Laterality Date    DILATION AND CURETTAGE   ,      SAB    LAPAROSCOPY   2006     lt.  ov cyst     OVARIAN CYST REMOVAL               Family History         Family History   Problem Relation Age of Onset    Colon Cancer Paternal Grandfather    Diabetes Paternal Grandfather      Colon Cancer Paternal Grandmother      Cancer Maternal Grandmother           KIDNEY    Breast Cancer Neg Hx      Eclampsia Neg Hx      Hypertension Neg Hx      Ovarian Cancer Neg Hx       Labor Neg Hx      Spont Abortions Neg Hx      Stroke Neg Hx              Review of Systems:  14 systems were reviewed. Positives noted above. Remainder are negative per CMS guidelines.       Exam  General: AAOx3, NAD  Head: atraumatic normocephalic  Neck: trachea midline. No masses or lymphadenopathy  Abdomen: soft, nontender, and nondistended  Ext: motor 5/5 all extremities with no gross deformities     Impression:    Diagnosis Orders   1. Family hx of colon cancer            Plan:  Patient with increased risk for colon cancer due to family history. Recommend elective colonoscopy. Patient informed of the risks of procedure which include but not limited to bleeding, perforation, and risks of anesthesia. Patient understood risks and signed informed consent for colonoscopy under monitored anesthesia care. Recommend screening colonoscopies in 5 year intervals.     Electronically signed by Enrico Mullen IV, DO on 20 at 11:46 AM CHRISTA Mueller D.O.   Surgery Department  Pager Number: [phone]

## 2021-01-15 NOTE — ANESTHESIA POSTPROCEDURE EVALUATION
Department of Anesthesiology  Postprocedure Note    Patient: Jose Juan Lewis  MRN: [id number]  YOB: 1975  Date of evaluation: 1/15/2021  Time:  2:53 PM     Procedure Summary     Date: 01/15/21 Room / Location: [address] [place] [address]    Anesthesia Start: 5719 Anesthesia Stop: 1419    Procedure: COLORECTAL CANCER SCREENING, NOT HIGH RISK (N/A ) Diagnosis: (SCREEN)    Surgeons: Patrisha Merlin Canos IV, DO Responsible Provider: Florence Johnston MD    Anesthesia Type: MAC ASA Status: 1          Anesthesia Type: MAC    Key Phase I: Key Score: 10    Key Phase II: Key Score: 9    Last vitals: Reviewed and per EMR flowsheets.        Anesthesia Post Evaluation    Patient location during evaluation: PACU  Patient participation: complete - patient participated  Level of consciousness: awake and alert  Airway patency: patent  Nausea & Vomiting: no nausea and no vomiting  Complications: no  Cardiovascular status: hemodynamically stable  Respiratory status: nasal cannula and spontaneous ventilation  Hydration status: euvolemic

## 2021-01-15 NOTE — OP NOTE
Procedure Note    DATE OF PROCEDURE: 1/15/2021    ENDOSCOPIST: Iva Mullen DO, MPH, FACS    ASSISTANT: Ashtyn Mueller, PGY5    PREOPERATIVE DIAGNOSIS: family history of colon cancer    POSTOPERATIVE DIAGNOSIS: normal colonic mucosa throughout, external hemorrhoids     OPERATION: Colonoscopy --screening    ANESTHESIA: MAC     ESTIMATED BLOOD LOSS: No    COMPLICATIONS: None. SPECIMENS: were not obtained    HISTORY: The patient is a 39y.o. year old female with history of above preop diagnosis. Colonoscopy with possible biopsy or polypectomy has been recommended and I explained the risk, benefits, expected outcome, and alternatives to the procedure. Risks included but are not limited to bleeding, infection, respiratory distress, hypotension, and perforation of the colon. The patient understands and is in agreement. PROCEDURE: The patient was given monitored anesthesia care. The patient was given oxygen by nasal cannula. The colonoscope was inserted per rectum and advanced under direct vision to the cecum without difficulty. Findings:  Cecum/Ascending colon: normal    Transverse colon: normal    Descending/Sigmoid colon: normal    Rectum/Anus: examined in normal and retroflexed positions and was normal. External hemorrhoids were noted. The colon was decompressed and the scope was removed. The patient tolerated the procedure well.      Recommendations:  High fiber diet  Repeat colonoscopy in 10 years      Electronically signed by Alberto Solomon DO  on 1/15/2021 at 2:20 PM

## 2021-01-15 NOTE — ANESTHESIA PRE PROCEDURE
Department of Anesthesiology  Preprocedure Note       Name:  Abdifatah Davis   Age:  39 y.o.  :  1975                                          MRN:  [id number]         Date:  1/15/2021      Surgeon: Mian Esquivel):  Viktor Mullen IV, DO    Procedure: Procedure(s):  COLORECTAL CANCER SCREENING, NOT HIGH RISK    Medications prior to admission:   Prior to Admission medications    Not on File       Current medications:    Current Facility-Administered Medications   Medication Dose Route Frequency Provider Last Rate Last Admin    lactated ringers infusion   Intravenous Continuous Jerry Schofield MD        sodium chloride flush 0.9 % injection 10 mL  10 mL Intravenous 2 times per day Jerry Schofield MD        sodium chloride flush 0.9 % injection 10 mL  10 mL Intravenous PRN Jerry Schofield MD        lidocaine PF 1 % injection 1 mL  1 mL Intradermal Once PRN Jerry Schofield MD           Allergies: Allergies   Allergen Reactions    Bactrim Hives    Sulfa Antibiotics Hives       Problem List:    Patient Active Problem List   Diagnosis Code    SAB (spontaneous ) O03.9    Hernia, umbilical H36.6    Missed  O02.1    Recurrent pregnancy loss, antepartum condition or complication E92.99    Major depressive disorder, single episode, mild (HCC) F32.0    Generalized anxiety disorder F41.1       Past Medical History:        Diagnosis Date    Major depressive disorder, single episode, mild ([address] 10/18/2018       Past Surgical History:        Procedure Laterality Date    DILATION AND CURETTAGE  ,     SAB    LAPAROSCOPY  2006    lt. ov cyst     OVARIAN CYST REMOVAL         Social History:    Social History     Tobacco Use    Smoking status: Former Smoker     Years: 20.00     Quit date: 10/1/2011     Years since quittin.2    Smokeless tobacco: Never Used   Substance Use Topics    Alcohol use:  No                                Counseling given: Not Answered Vital Signs (Current):   Vitals:    01/15/21 1242   BP: 112/77   Pulse: 60   Resp: 19   SpO2: 99%   Weight: 188 lb (85.3 kg)   Height: 5' 4\" (1.626 m)                                              BP Readings from Last 3 Encounters:   01/15/21 112/77   11/30/20 118/72   11/13/20 114/72       NPO Status: Time of last liquid consumption: 2330                        Time of last solid consumption: 1630                        Date of last liquid consumption: 01/14/21                        Date of last solid food consumption: 01/13/21    BMI:   Wt Readings from Last 3 Encounters:   01/15/21 188 lb (85.3 kg)   12/28/20 189 lb (85.7 kg)   11/30/20 189 lb (85.7 kg)     Body mass index is 32.27 kg/m². CBC:   Lab Results   Component Value Date    WBC 5.3 07/24/2019    RBC 4.43 07/24/2019    RBC 3.90 06/03/2012    HGB 14.0 07/24/2019    HCT 40.7 07/24/2019    MCV 91.7 07/24/2019    RDW 13.0 07/24/2019     07/24/2019     06/03/2012       CMP:   Lab Results   Component Value Date    GLUCOSE 77 11/08/2011       POC Tests: No results for input(s): POCGLU, POCNA, POCK, POCCL, POCBUN, POCHEMO, POCHCT in the last 72 hours.     Coags:   Lab Results   Component Value Date    PROTIME 12.6 07/24/2019    INR 0.9 07/24/2019    APTT 28.7 07/24/2019       HCG (If Applicable):   Lab Results   Component Value Date    PREGTESTUR negative 11/13/2020    HCG NEGATIVE 01/15/2021    HCGQUANT 878660 (H) 11/08/2011        ABGs: No results found for: PHART, PO2ART, ACR8HUQ, DQJ7TTM, BEART, U0FZNWAR     Type & Screen (If Applicable):  No results found for: LABABO, LABRH    Drug/Infectious Status (If Applicable):  No results found for: HIV, HEPCAB    COVID-19 Screening (If Applicable):   Lab Results   Component Value Date    COVID19 Not Detected 01/11/2021         Anesthesia Evaluation  Patient summary reviewed and Nursing notes reviewed  Airway: Mallampati: III  TM distance: >3 FB   Neck ROM: full  Mouth opening: > = 3 FB Dental: Comment: -MISSING SOME UPPER AND LOWER TEETH    Pulmonary:Negative Pulmonary ROS and normal exam                              ROS comment: -QUIT SMOKING 2011   Cardiovascular:Negative CV ROS                      Neuro/Psych:   Negative Neuro/Psych ROS              GI/Hepatic/Renal: Neg GI/Hepatic/Renal ROS            Endo/Other: Negative Endo/Other ROS                     ROS comment: -NPO AFTER MIDNIGHT  -ALLERGIES - SULFA Abdominal:           Vascular: negative vascular ROS. Anesthesia Plan      MAC     ASA 1       Induction: intravenous. MIPS: Postoperative opioids intended and Prophylactic antiemetics administered. Anesthetic plan and risks discussed with patient. Plan discussed with CRNA.     Attending anesthesiologist reviewed and agrees with Pre Eval content              Antonio Maloney MD   1/15/2021

## 2021-01-27 ENCOUNTER — HOSPITAL ENCOUNTER (OUTPATIENT)
Dept: WOMENS IMAGING | Age: 46
Discharge: HOME | End: 2021-01-29
Payer: COMMERCIAL

## 2021-01-27 DIAGNOSIS — Z12.79 ENCNTR SCREEN FOR MALIGNANT NEOPLASM OF GENITOURINARY ORGANS: ICD-10-CM

## 2021-01-27 PROCEDURE — 77063 BREAST TOMOSYNTHESIS BI: CPT

## 2021-04-06 ENCOUNTER — OFFICE VISIT (OUTPATIENT)
Dept: ORTHOPEDIC SURGERY | Age: 46
End: 2021-04-06
Payer: COMMERCIAL

## 2021-04-06 VITALS — RESPIRATION RATE: 16 BRPM | WEIGHT: 194 LBS | HEIGHT: 64 IN | BODY MASS INDEX: 33.12 KG/M2

## 2021-04-06 DIAGNOSIS — G89.29 CHRONIC MIDLINE LOW BACK PAIN WITHOUT SCIATICA: Primary | ICD-10-CM

## 2021-04-06 DIAGNOSIS — M54.50 CHRONIC MIDLINE LOW BACK PAIN WITHOUT SCIATICA: Primary | ICD-10-CM

## 2021-04-06 PROCEDURE — 1036F TOBACCO NON-USER: CPT | Performed by: ORTHOPAEDIC SURGERY

## 2021-04-06 PROCEDURE — G8417 CALC BMI ABV UP PARAM F/U: HCPCS | Performed by: ORTHOPAEDIC SURGERY

## 2021-04-06 PROCEDURE — 99203 OFFICE O/P NEW LOW 30 MIN: CPT | Performed by: ORTHOPAEDIC SURGERY

## 2021-04-06 PROCEDURE — G8427 DOCREV CUR MEDS BY ELIG CLIN: HCPCS | Performed by: ORTHOPAEDIC SURGERY

## 2021-04-06 RX ORDER — IBUPROFEN 800 MG/1
800 TABLET ORAL EVERY 6 HOURS PRN
COMMUNITY

## 2021-04-06 NOTE — PROGRESS NOTES
Patient ID: Darwin Sneed is a 39 y.o. female    Chief Compliant:  No chief complaint on file. HPI:  This is a 39 y.o. female who presents to the clinic today for lumbar spine evaluation. Patient reports 2 years ago she was walking a stepped off a sidewalk incorrectly and injured her back    Since then she describes a constant grinding pain in her lower back, she utilizes NSAIDs for pain relief    She was seen by a chiropractor who did not treat her for low back pain but manages her neck pain    Hx of bulging disc, scoliosis and backward curvature of neck per patient    Review of Systems   All other systems reviewed and are negative. Past History:  No current outpatient medications on file.   Allergies   Allergen Reactions    Bactrim Hives    Sulfa Antibiotics Hives     Social History     Socioeconomic History    Marital status:      Spouse name: Not on file    Number of children: Not on file    Years of education: Not on file    Highest education level: Not on file   Occupational History    Not on file   Social Needs    Financial resource strain: Not on file    Food insecurity     Worry: Not on file     Inability: Not on file    Transportation needs     Medical: Not on file     Non-medical: Not on file   Tobacco Use    Smoking status: Former Smoker     Years: 20.00     Quit date: 10/1/2011     Years since quittin.5    Smokeless tobacco: Never Used   Substance and Sexual Activity    Alcohol use: No    Drug use: No    Sexual activity: Yes     Partners: Male   Lifestyle    Physical activity     Days per week: Not on file     Minutes per session: Not on file    Stress: Not on file   Relationships    Social connections     Talks on phone: Not on file     Gets together: Not on file     Attends Pentecostal service: Not on file     Active member of club or organization: Not on file     Attends meetings of clubs or organizations: Not on file     Relationship status: Not on file   [employer] Intimate partner violence     Fear of current or ex partner: Not on file     Emotionally abused: Not on file     Physically abused: Not on file     Forced sexual activity: Not on file   Other Topics Concern    Not on file   Social History Narrative    Not on file     Past Medical History:   Diagnosis Date    Major depressive disorder, single episode, mild ([facility] [address] 10/18/2018     Past Surgical History:   Procedure Laterality Date    COLONOSCOPY  01/15/2021    COLONOSCOPY N/A 1/15/2021    COLORECTAL CANCER SCREENING, NOT HIGH RISK performed by Leyla Mullen IV, DO at [address] [place] [address]  ,     SAB    LAPAROSCOPY  2006    lt. ov cyst     OVARIAN CYST REMOVAL       Family History   Problem Relation Age of Onset    Colon Cancer Paternal Grandfather     Diabetes Paternal Grandfather     Colon Cancer Paternal Grandmother     Cancer Maternal Grandmother         KIDNEY    Breast Cancer Neg Hx     Eclampsia Neg Hx     Hypertension Neg Hx     Ovarian Cancer Neg Hx      Labor Neg Hx     Spont Abortions Neg Hx     Stroke Neg Hx         Physical Exam:  Vitals signs and nursing note reviewed. Constitutional:       Appearance: She is well-developed. HENT:      Head: Normocephalic and atraumatic. Nose: Nose normal.   Eyes:      Conjunctiva/sclera: Conjunctivae normal.   Neck:      Musculoskeletal: Normal range of motion and neck supple. Pulmonary:      Effort: Pulmonary effort is normal. No respiratory distress. Musculoskeletal:      Comments: Normal gait     Skin:     General: Skin is warm and dry. Neurological:      Mental Status: She is alert and oriented to person, place, and time. Sensory: No sensory deficit. Psychiatric:         Behavior: Behavior normal.         Thought Content: Thought content normal.    Normal gait. Slight guarding of lumbar spine when first stands up.     Patient is able to generate a little bit of crepitance in her back with sideways bending    Diagnostic x-rays September 2020 lumbar spine age-appropriate      Diagnostic Imaging:    Diagnostic x-rays September 2020 lumbar spine age-appropriate    Assessment and Plan:  1. Chronic midline low back pain without sciatica        PT prescribed    If patient does not improve with PT we will order an MRI lumbar    Follow up in 6 weeks      Provider Attestation:  Nimo Ching, personally performed the services described in this documentation. All medical record entries made by the scribe were at my direction and in my presence. I have reviewed the chart and discharge instructions and agree that the records reflect my personal performance and is accurate and complete. Charles Hollis MD 4/6/21     Scribe Attestation:  By signing my name below, Maxine Emma, attest that this documentation has been prepared under the direction and in the presence of Dr. Jamie Veliz. Electronically signed: Harmony Pardo, 4/6/21     Please note that this chart was generated using voice recognition Dragon dictation software. Although every effort was made to ensure the accuracy of this automated transcription, some errors in transcription may have occurred.

## 2021-12-21 ENCOUNTER — HOSPITAL ENCOUNTER (OUTPATIENT)
Age: 46
Setting detail: SPECIMEN
Discharge: HOME | End: 2021-12-21

## 2021-12-21 ENCOUNTER — OFFICE VISIT (OUTPATIENT)
Dept: OBGYN CLINIC | Age: 46
End: 2021-12-21
Payer: COMMERCIAL

## 2021-12-21 VITALS
WEIGHT: 197 LBS | SYSTOLIC BLOOD PRESSURE: 110 MMHG | HEIGHT: 60 IN | BODY MASS INDEX: 38.68 KG/M2 | DIASTOLIC BLOOD PRESSURE: 72 MMHG

## 2021-12-21 DIAGNOSIS — Z01.419 ENCOUNTER FOR GYNECOLOGICAL EXAMINATION WITHOUT ABNORMAL FINDING: Primary | ICD-10-CM

## 2021-12-21 DIAGNOSIS — N92.1 MENORRHAGIA WITH IRREGULAR CYCLE: ICD-10-CM

## 2021-12-21 DIAGNOSIS — Z80.0 FAMILY HISTORY OF COLON CANCER: Primary | ICD-10-CM

## 2021-12-21 DIAGNOSIS — Z11.51 SPECIAL SCREENING EXAMINATION FOR HUMAN PAPILLOMAVIRUS (HPV): ICD-10-CM

## 2021-12-21 DIAGNOSIS — Z12.31 ENCOUNTER FOR SCREENING MAMMOGRAM FOR MALIGNANT NEOPLASM OF BREAST: ICD-10-CM

## 2021-12-21 PROCEDURE — 99396 PREV VISIT EST AGE 40-64: CPT | Performed by: NURSE PRACTITIONER

## 2021-12-21 PROCEDURE — G8484 FLU IMMUNIZE NO ADMIN: HCPCS | Performed by: NURSE PRACTITIONER

## 2021-12-21 ASSESSMENT — PATIENT HEALTH QUESTIONNAIRE - PHQ9
SUM OF ALL RESPONSES TO PHQ QUESTIONS 1-9: 0
SUM OF ALL RESPONSES TO PHQ QUESTIONS 1-9: 0
2. FEELING DOWN, DEPRESSED OR HOPELESS: 0
1. LITTLE INTEREST OR PLEASURE IN DOING THINGS: 0
SUM OF ALL RESPONSES TO PHQ9 QUESTIONS 1 & 2: 0
SUM OF ALL RESPONSES TO PHQ QUESTIONS 1-9: 0

## 2021-12-21 NOTE — PROGRESS NOTES
History and Physical  [address], [address] [facility] [address], [facility]. [phone]   Fax [phone]  Conrad Horton  2021              42 y.o. Chief Complaint   Patient presents with    Gynecologic Exam       Patient's last menstrual period was 2021 (approximate). Primary Care Physician: No primary care provider on file. The patient was seen and examined. She has no chief complaint today and is here for her annual exam.  Her bowels are regular. There are no voiding complaints. She denies any bloating. She denies vaginal discharge and was counseled on STD's and the need for barrier contraception. HPI : Conrad Horton is a 42 y.o. female [id number]    Annual exam  Complaining of heavy, painful menses. Periods have been occurring every 1 month for past 3 months, lasting 3-5 days long. Previously had 5 months with no menses. On heaviest days, changes pad every 30-45 minutes. Now experiencing pain with intercourse with  of 16 years. Hx of endometriosis diagnosed by laparopscopic surgery in .  with vasectomy.   Considering ablation vs hysterectomy    ________________________________________________________________________  OB History    Para Term  AB Living   9 6 6 0 3 6   SAB IAB Ectopic Molar Multiple Live Births   1 0 0 0 0 6      # Outcome Date GA Lbr Mu/2nd Weight Sex Delivery Anes PTL Lv   9 Term 16 40w0d   F    FARHAN   8 Term 12 39w0d  7 lb 12 oz (3.515 kg) M Vag-Spont EPI N FARHAN      Name: Philip Weller: 8  Apgar5: 9   7 Term 09 39w1d  7 lb 9 oz (3.43 kg) M Vag-Spont EPI N FARHAN      Birth Comments: ABO INCOMPATABILITY, JAUNDICE BABY      Name: Anel Berg: 8  Apgar5: 9   6 Term 08 39w0d  8 lb 8 oz (3.856 kg) M Vag-Spont EPI N FARHAN      Name: Cori Rollin  Apgar5: 9   5 2007              Birth Comments: 9 WEEKS   4 Term 01 40w0d  6 lb 15 oz Health     Financial Resource Strain:     Difficulty of Paying Living Expenses: Not on file   Food Insecurity:     Worried About Running Out of Food in the Last Year: Not on file    Abner of Food in the Last Year: Not on file   Transportation Needs:     Lack of Transportation (Medical): Not on file    Lack of Transportation (Non-Medical): Not on file   Physical Activity:     Days of Exercise per Week: Not on file    Minutes of Exercise per Session: Not on file   Stress:     Feeling of Stress : Not on file   Social Connections:     Frequency of Communication with Friends and Family: Not on file    Frequency of Social Gatherings with Friends and Family: Not on file    Attends Islam Services: Not on file    Active Member of [address] [place] [address] or Organizations: Not on file    Attends Club or Organization Meetings: Not on file    Marital Status: Not on file   Intimate Partner Violence:     Fear of Current or Ex-Partner: Not on file    Emotionally Abused: Not on file    Physically Abused: Not on file    Sexually Abused: Not on file   Housing Stability:     Unable to Pay for Housing in the Last Year: Not on file    Number of [place] in the Last Year: Not on file    Unstable Housing in the Last Year: Not on file       MEDICATIONS:  Current Outpatient Medications   Medication Sig Dispense Refill    ibuprofen (ADVIL;MOTRIN) 800 MG tablet Take 800 mg by mouth every 6 hours as needed for Pain       No current facility-administered medications for this visit.            ALLERGIES:  Allergies as of 12/21/2021 - Fully Reviewed 12/21/2021   Allergen Reaction Noted    Bactrim Hives 10/31/2011    Sulfa antibiotics Hives 05/11/2012       Symptoms of decreased mood absent  Symptoms of anhedonia absent    **If either question is answered in a  positive fashion then complete the PHQ9 Scoring Evaluation and make the appropriate referral**      Immunization status: stated as current, but no records available. Gynecologic History:  Menarche: 9 yo  Menopause at [address] [place] yo     Patient's last menstrual period was 12/13/2021 (approximate). Sexually Active: Yes    STD History: No     Permanent Sterilization: No - with vasectomy  Reversible Birth Control: No        Hormone Replacement Exposure: No      Genetic Qualified Family History of Breast, Ovarian , Colon or Uterine Cancer: Yes (paternal grandfather with colon cancer- 76s, paternal grandmother with colon cancer, paternal aunt with colon cancer- 45s)     If YES see scanned worksheet. Preventative Health Testing:    Health Maintenance:  Health Maintenance Due   Topic Date Due    Hepatitis C screen  Never done    COVID-19 Vaccine (1) Never done    DTaP/Tdap/Td vaccine (1 - Tdap) Never done    Diabetes screen  Never done    Flu vaccine (1) Never done       Date of Last Pap Smear: 11/30/2020 neg/neg  Abnormal Pap Smear History: denies  Colposcopy History:   Date of Last Mammogram: 1/27/2021 neg  Date of Last Colonoscopy: January 2021- normal  Date of Last Bone Density:      ________________________________________________________________________        REVIEW OF SYSTEMS:    yes   A minimum of an eleven point review of systems was completed. Review Of Systems (11 point):  Constitutional: No fever, chills or malaise;  No weight change or fatigue  Head and Eyes: No vision changes, Headache, Dizziness or trauma in last 12 months  ENT ROS: No hearing, Tinnitis, sinus or taste problems  Hematological and Lymphatic ROS:No Lymphoma, Von Willebrand's, Hemophillia or Bleeding History  Psych ROS: No Depression, Homicidal thoughts,suicidal thoughts, or anxiety  Breast ROS: No breast abnormalities or lumps  Respiratory ROS: No SOB, Pneumoniae,Cough, or Pulmonary Embolism   Cardiovascular ROS: No Chest Pain with Exertion, Palpitations, Syncope, Edema, Arrhythmia  Gastrointestinal ROS: No Indigestion, Heartburn, Nausea, vomiting, Diarrhea, Constipation,or Bowel Changes; No Bloody Stools or melena  Genito-Urinary ROS: No Dysuria, Hematuria or Nocturia. No Urinary Incontinence or Vaginal Discharge  Musculoskeletal ROS: No Arthralgia, Arthritis,Gout,Osteoporosis or Rheumatism  Neurological ROS: No CVA, Migraines, Epilepsy, Seizure Hx, or Limb Weakness  Dermatological ROS: No Rash, Itching, Hives, Mole Changes or Cancer                                                                                                                                                                                                                                  PHYSICAL Exam:     Constitutional:  Vitals:    12/21/21 0929   BP: 110/72   Site: Right Upper Arm   Position: Sitting   Cuff Size: Large Adult   Weight: 197 lb (89.4 kg)   Height: 5' (1.524 m)       Chaperone for Intimate Exam   Chaperone was offered and accepted as part of the rooming process.  Chaperone: Sarah          General Appearance: This  is a well Developed, well Nourished, well groomed female. Her BMI was reviewed. Nutritional decision making was discussed. Skin:  There was a Normal Inspection of the skin without rashes or lesions. There were no rashes. (Papular, Maculopapular, Hives, Pustular, Macular)     There were no lesions (Ulcers, Erythema, Abn. Appearing Nevi)            Lymphatic:  No Lymph Nodes were Palpable in the neck , axilla or groin.  0 # Of Lymph Nodes; Location ; Character [Normal]  [Shotty] [Tender] [Enlarged]     Neck and EENT:  The neck was supple. There were no masses   The thyroid was not enlarged and had no masses. Perrla, EOMI B/L, TMI B/L No Abnormalities. Throat inspected-No exudates or Masses, Nares Patent No Masses        Respiratory: The lungs were auscultated and found to be clear. There were no rales, rhonchi or wheezes. There was a good respiratory effort. Cardiovascular: The heart was in a regular rate and rhythm. . No S3 or S4. There was no murmur appreciated. Location, grade, and radiation are not applicable. Extremities: The patients extremities were without calf tenderness, edema, or varicosities. There was full range of motion in all four extremities. Pulses in all four extremities were appreciated and are 2/4. Abdomen: The abdomen was soft and non-tender. There were good bowel sounds in all quadrants and there was no guarding, rebound or rigidity. On evaluation there was no evidence of hepatosplenomegaly and there was no costal vertebral niesha tenderness bilaterally. No hernias were appreciated. Abdominal Scars: C/W previous surgeries    Psych: The patient had a normal Orientation to: Time, Place, Person, and Situation  There is no Mood / Affect changes    Breast:  (Chest)  normal appearance, no masses or tenderness  Self breast exams were reviewed in detail. Literature was given. Pelvic Exam:  External genitalia: normal general appearance  Urinary system: urethral meatus normal  Vaginal: normal mucosa without prolapse or lesions  Cervix: normal appearance  Adnexa: normal bimanual exam  Uterus: normal single, nontender    Rectal Exam:  exam declined by patient          Musculosk:  Normal Gait and station was noted. Digits were evaluated without abnormal findings. Range of motion, stability and strength were evaluated and found to be appropriate for the patients age. ASSESSMENT:      42 y.o. Annual   Diagnosis Orders   1. Encounter for gynecological examination without abnormal finding  PAP SMEAR   2. Special screening examination for human papillomavirus (HPV)  PAP SMEAR   3. Encounter for screening mammogram for malignant neoplasm of breast  SARAH DIGITAL SCREEN W OR WO CAD BILATERAL   4.  Menorrhagia with irregular cycle  US PELVIS COMPLETE    US NON OB TRANSVAGINAL    HCG, Quantitative, Pregnancy    TSH with Reflex    CBC Auto Differential    Protime-INR    APTT    Hemoglobin A1C          Chief Complaint   Patient presents with   [employer] testing. Birth control and barrier recommendations discussed. STD counseling and prevention reviewed. Gardisil counseling completed for all patients 10-35 yo. Routine health maintenance per patients PCP. Orders Placed This Encounter   Procedures    SARAH DIGITAL SCREEN W OR WO CAD BILATERAL     Standing Status:   Future     Standing Expiration Date:   2023     Order Specific Question:   Reason for exam:     Answer:   screening mamogram    US PELVIS COMPLETE     Standing Status:   Future     Standing Expiration Date:   3/21/2022     Order Specific Question:   Reason for exam:     Answer:   heavy menses    US NON OB TRANSVAGINAL     Standing Status:   Future     Standing Expiration Date:   2022     Order Specific Question:   Reason for exam:     Answer:   heavy menses    PAP SMEAR     Patient History:    No LMP recorded. OBGYN Status: Having periods  Past Surgical History:  01/15/2021: COLONOSCOPY  1/15/2021: COLONOSCOPY; N/A      Comment:  COLORECTAL CANCER SCREENING, NOT HIGH RISK performed by                Claudia [place], [place] at [address] [facility].  2014: [address]:  SAB  2006: LAPAROSCOPY      Comment:  lt. ov cyst   2006: OVARIAN CYST REMOVAL      Social History    Tobacco Use      Smoking status: Former Smoker        Packs/day: 0.50        Years: 30.00        Pack years: 15        Types: Cigarettes        Start date:         Quit date: 2021        Years since quittin.9      Smokeless tobacco: Never Used      Tobacco comment: 23: Quit: 10/01/2011, Quit off and on over the years. Standing Status:   Future     Standing Expiration Date:   2022     Order Specific Question:   Collection Type     Answer: Thin Prep     Order Specific Question:   Prior Abnormal Pap Test     Answer:   No     Order Specific Question:   Screening or Diagnostic     Answer:   Screening     Order Specific Question:   HPV Requested? Answer:    Yes Order Specific Question:   High Risk Patient     Answer:   N/A    HCG, Quantitative, Pregnancy     Standing Status:   Future     Standing Expiration Date:   12/21/2022    TSH with Reflex     Standing Status:   Future     Standing Expiration Date:   12/21/2022    CBC Auto Differential     Standing Status:   Future     Standing Expiration Date:   12/21/2022    Protime-INR     Standing Status:   Future     Standing Expiration Date:   12/21/2022     Order Specific Question:   Daily Coumadin Dose? Answer:   N    APTT     Standing Status:   Future     Standing Expiration Date:   12/21/2022     Order Specific Question:   Daily Heparin Dose? Answer:   N    Hemoglobin A1C     Standing Status:   Future     Standing Expiration Date:   12/21/2022           The patient, Wicho Jenkins is a 42 y.o. female, was seen with a total time spent of 20 minutes for the visit on this date of service by the E/M provider. The time component had both face to face and non face to face time spent in determining the total time component. Counseling and education regarding her diagnosis listed below and her options regarding those diagnoses were also included in determining her time component. Diagnosis Orders   1. Encounter for gynecological examination without abnormal finding  PAP SMEAR   2. Special screening examination for human papillomavirus (HPV)  PAP SMEAR   3. Encounter for screening mammogram for malignant neoplasm of breast  SARAH DIGITAL SCREEN W OR WO CAD BILATERAL   4. Menorrhagia with irregular cycle  US PELVIS COMPLETE    US NON OB TRANSVAGINAL    HCG, Quantitative, Pregnancy    TSH with Reflex    CBC Auto Differential    Protime-INR    APTT    Hemoglobin A1C        The patient had her preventative health maintenance recommendations and follow-up reviewed with her at the completion of her visit.

## 2021-12-23 LAB
HPV SAMPLE: NORMAL
HPV, GENOTYPE 16: NOT DETECTED
HPV, GENOTYPE 18: NOT DETECTED
HPV, HIGH RISK OTHER: NOT DETECTED
HPV, INTERPRETATION: NORMAL
SPECIMEN DESCRIPTION: NORMAL

## 2022-01-03 LAB — CYTOLOGY REPORT: NORMAL

## 2022-04-05 ENCOUNTER — INITIAL CONSULT (OUTPATIENT)
Dept: ONCOLOGY | Age: 47
End: 2022-04-05
Payer: COMMERCIAL

## 2022-04-05 DIAGNOSIS — Z80.0 FAMILY HISTORY OF COLON CANCER: ICD-10-CM

## 2022-04-05 DIAGNOSIS — Z80.3 FAMILY HISTORY OF BREAST CANCER: Primary | ICD-10-CM

## 2022-04-05 PROCEDURE — 96040 PR GENETIC COUNSELING, EACH 30 MIN: CPT | Performed by: GENETIC COUNSELOR, MS

## 2022-04-05 NOTE — PROGRESS NOTES
[address] [facility] Program   Hereditary Cancer Risk Assessment     Name: Lori Schaffer   YOB: 1975   Date of Consultation: 4/5/22     Ms. Wilson Parish was seen at the [facility] for genetic counseling on 4/5/22. Ms. Wilson Parish was referred by EVANGELISTA Mcwilliams due to her family history of various cancers. PERSONAL HISTORY   Ms. Wilson Parish is a 42 y.o.  female with no personal history of cancer. She does report a history of endometriosis, is currently having irregular menstrual periods. Ms. Wilson Parish reports annual mammograms. She has never had a breast MRI or required a breast biopsy. FAMILY HISTORY  Ms. Wilson Parish has 5 son(s) and 1 daughter(s). She has one maternal half brother. Ms. Laine Boyle mother is living, no cancer; however she has had several tumors removed from her neck and one from her back. Her maternal grandmother was diagnosed with kidney and lung cancers. Ms. Laine Boyle father is living with a history of skin cancer removed from his face. A paternal aunt was diagnosed with colon cancer at age 48 and another paternal aunt was diagnosed with breast cancer in her 25s. Her paternal grandmother had colon cancer in her 76s. Her paternal grandfather also had colon cancer in his 76s. Ms. Wilson Parish reports unknown ancestry and denies any known Ashkenazi Mormonism heritage. RISK ASSESSMENT   We discussed that approximately 5-10% of cancers are due to a hereditary gene mutation which causes an increased risk for certain cancers. Hereditary cancers are typically diagnosed at younger ages (under age 46y) and occur in multiple generations of a family. Multiple individuals with the same type of cancer (example: breast or colorectal) or uncommon cancers (example: ovarian, pancreatic, male breast cancer) are also features of hereditary cancers.      In summary, Ms. Wilson Parish meets the [address] [place] [address] of [facility] (NCCN) guidelines for genetic testing of the BRCA1/2 genes based on having a paternal second degree relative with breast cancer under age 39. She also meets criteria for the Valadez syndrome genes by having at least two paternal relatives with colon cancer, one diagnosed at age 48. The NCCN guidelines also recognize that an individual's personal and/or family history may be explained by more than one inherited cancer syndrome. Thus, a multi-gene panel may be more efficient, more cost effecting, and increases the yield of detecting a hereditary mutation which would impact medical management. Given her personal and/or family history, we recommend testing for the following genes at minimum: JAMIE, CHEK2, NBN, and PALB2. DISCUSSION  We discussed that the BRCA1/2 genes are the most common genes associated with hereditary breast and ovarian cancer. We also discussed that genetic testing is available for multiple other genes related to hereditary cancer. Some of these genes are known to carry a significant increased risk for several cancers including colon, breast, uterine, ovarian, stomach, and pancreatic cancer, while some of these genes are believed to have a moderate increased risk for breast and other cancers. We discussed the possibility of finding a mutation in genes with limited information to guide medical management, as well we as the possibility of identifying variants of uncertain significance (VUS). We discussed the risks, benefits, and limitations of genetic testing. Possible test results were discussed as well as potential screening and prevention strategies. Specifically, we discussed increased breast cancer surveillance by mammogram and breast MRI as well as the option of prophylactic mastectomy. We discussed the recommendation for prophylactic oophorectomy for results which suggest an increased risk for ovarian cancer. Lastly, we discussed that the results of Ms. Tracy's genetic testing may be beneficial in defining her risk for cancer as well as for her family members. SUMMARY & PLAN  1) Ms. Ranjit German meets the NCCN criteria for genetic testing based on her paternal family history of early onset breast cancer and colon cancer. 2) Genetic testing via a multi-gene panel was recommended and offered to Ms. Tracy. 3) Medical [place] does require prior authorization of genetic testing which will be requested. We will contact Ms. Ranjit German when the insurance determination has been made. We will coordinate blood work at that time. A total of 35 minutes were spent face to face with Ms. Ranjit German and 50% of the time was spent educating and counseling. The [facility] would be glad to offer our assistance should you have any questions or concerns about this information. Please feel free to contact us at [phone]. Cheng Garvin, MS, [facility]   Licensed Genetic Counselor

## 2022-04-19 ENCOUNTER — OFFICE VISIT (OUTPATIENT)
Dept: OBGYN CLINIC | Age: 47
End: 2022-04-19
Payer: COMMERCIAL

## 2022-04-19 ENCOUNTER — HOSPITAL ENCOUNTER (OUTPATIENT)
Dept: WOMENS IMAGING | Age: 47
Discharge: HOME | End: 2022-04-21
Payer: COMMERCIAL

## 2022-04-19 ENCOUNTER — TELEPHONE (OUTPATIENT)
Dept: OBGYN CLINIC | Age: 47
End: 2022-04-19

## 2022-04-19 DIAGNOSIS — Z12.31 ENCOUNTER FOR SCREENING MAMMOGRAM FOR MALIGNANT NEOPLASM OF BREAST: ICD-10-CM

## 2022-04-19 DIAGNOSIS — N92.1 MENORRHAGIA WITH IRREGULAR CYCLE: ICD-10-CM

## 2022-04-19 PROCEDURE — 77063 BREAST TOMOSYNTHESIS BI: CPT

## 2022-04-19 PROCEDURE — 76856 US EXAM PELVIC COMPLETE: CPT | Performed by: OBSTETRICS & GYNECOLOGY

## 2022-04-19 PROCEDURE — 76830 TRANSVAGINAL US NON-OB: CPT | Performed by: OBSTETRICS & GYNECOLOGY

## 2022-04-19 NOTE — TELEPHONE ENCOUNTER
Per Juanita Morrison pt notified of pelvic US results showing Left ovarian simple cyst noted. pt encouraged  to keep her follow up with physician to discuss results/ heavy menses.

## 2022-04-19 NOTE — TELEPHONE ENCOUNTER
----- Message from SANJAY Hernandez CNP sent at 4/19/2022  2:03 PM EDT -----  Left ovarian simple cyst noted  No follow up required for simple cyst.  Please let patient  know results and encourage her to keep follow up with physician to discuss results/ heavy menses.

## 2022-04-29 ENCOUNTER — TELEPHONE (OUTPATIENT)
Dept: ONCOLOGY | Age: 47
End: 2022-04-29

## 2022-04-29 NOTE — TELEPHONE ENCOUNTER
Left detailed message for Jer Davis explaining that prior [facility] was approved for genetic testing through 7/30/22 (authorization attached to this encounter in media). Requested that patient call me back if she wishes to proceed with testing. At that point, we can schedule her to come back into office for blood work.

## 2022-05-02 ENCOUNTER — HOSPITAL ENCOUNTER (OUTPATIENT)
Age: 47
Discharge: HOME | End: 2022-05-02
Payer: COMMERCIAL

## 2022-05-02 DIAGNOSIS — N92.1 MENORRHAGIA WITH IRREGULAR CYCLE: ICD-10-CM

## 2022-05-02 LAB
ABSOLUTE EOS #: 0.2 K/UL (ref 0–0.4)
ABSOLUTE LYMPH #: 1.7 K/UL (ref 1–4.8)
ABSOLUTE MONO #: 0.3 K/UL (ref 0.1–1.3)
BASOPHILS # BLD: 1 % (ref 0–2)
BASOPHILS ABSOLUTE: 0 K/UL (ref 0–0.2)
EOSINOPHILS RELATIVE PERCENT: 3 % (ref 0–4)
HCG QUANTITATIVE: 1 MIU/ML
HCT VFR BLD CALC: 40.6 % (ref 36–46)
HEMOGLOBIN: 13.7 G/DL (ref 12–16)
INR BLD: 0.9
LYMPHOCYTES # BLD: 34 % (ref 24–44)
MCH RBC QN AUTO: 29.7 PG (ref 26–34)
MCHC RBC AUTO-ENTMCNC: 33.8 G/DL (ref 31–37)
MCV RBC AUTO: 87.8 FL (ref 80–100)
MONOCYTES # BLD: 6 % (ref 1–7)
PARTIAL THROMBOPLASTIN TIME: 26 SEC (ref 24–36)
PDW BLD-RTO: 13 % (ref 11.5–14.9)
PLATELET # BLD: 201 K/UL (ref 150–450)
PMV BLD AUTO: 9.8 FL (ref 6–12)
PROTHROMBIN TIME: 11.8 SEC (ref 11.8–14.6)
RBC # BLD: 4.63 M/UL (ref 4–5.2)
SEG NEUTROPHILS: 56 % (ref 36–66)
SEGMENTED NEUTROPHILS ABSOLUTE COUNT: 2.9 K/UL (ref 1.3–9.1)
TSH SERPL DL<=0.05 MIU/L-ACNC: 1.3 UIU/ML (ref 0.3–5)
WBC # BLD: 5.1 K/UL (ref 3.5–11)

## 2022-05-02 PROCEDURE — 85025 COMPLETE CBC W/AUTO DIFF WBC: CPT

## 2022-05-02 PROCEDURE — 84702 CHORIONIC GONADOTROPIN TEST: CPT

## 2022-05-02 PROCEDURE — 85610 PROTHROMBIN TIME: CPT

## 2022-05-02 PROCEDURE — 84443 ASSAY THYROID STIM HORMONE: CPT

## 2022-05-02 PROCEDURE — 83036 HEMOGLOBIN GLYCOSYLATED A1C: CPT

## 2022-05-02 PROCEDURE — 36415 COLL VENOUS BLD VENIPUNCTURE: CPT

## 2022-05-02 PROCEDURE — 85730 THROMBOPLASTIN TIME PARTIAL: CPT

## 2022-05-03 ENCOUNTER — OFFICE VISIT (OUTPATIENT)
Dept: OBGYN CLINIC | Age: 47
End: 2022-05-03
Payer: COMMERCIAL

## 2022-05-03 VITALS
HEIGHT: 63 IN | SYSTOLIC BLOOD PRESSURE: 114 MMHG | BODY MASS INDEX: 36.5 KG/M2 | WEIGHT: 206 LBS | DIASTOLIC BLOOD PRESSURE: 72 MMHG

## 2022-05-03 DIAGNOSIS — R23.2 HOT FLASHES: ICD-10-CM

## 2022-05-03 DIAGNOSIS — N95.1 PERIMENOPAUSAL SYMPTOMS: ICD-10-CM

## 2022-05-03 DIAGNOSIS — N92.6 IRREGULAR MENSES: ICD-10-CM

## 2022-05-03 DIAGNOSIS — N94.6 DYSMENORRHEA: Primary | ICD-10-CM

## 2022-05-03 LAB
ESTIMATED AVERAGE GLUCOSE: 94 MG/DL
HBA1C MFR BLD: 4.9 % (ref 4–6)

## 2022-05-03 PROCEDURE — G8417 CALC BMI ABV UP PARAM F/U: HCPCS | Performed by: OBSTETRICS & GYNECOLOGY

## 2022-05-03 PROCEDURE — 1036F TOBACCO NON-USER: CPT | Performed by: OBSTETRICS & GYNECOLOGY

## 2022-05-03 PROCEDURE — 99213 OFFICE O/P EST LOW 20 MIN: CPT | Performed by: OBSTETRICS & GYNECOLOGY

## 2022-05-03 PROCEDURE — G8427 DOCREV CUR MEDS BY ELIG CLIN: HCPCS | Performed by: OBSTETRICS & GYNECOLOGY

## 2022-05-03 NOTE — PROGRESS NOTES
Dottie Bhandari  5/3/2022    YOB: 1975          The patient was seen today. She is here regarding follow up on pelvic sono/ labs. Pt states her periods are irregular every 4-6 months. Pt states her pain from her endometriosis as well as with intercourse has improved. Pt is sexually active and her  had a vasectomy . Her bowels are regular and she is voiding without difficulty. HPI:  Dottie Bhandari is a 42 y.o. female [id number]       OB History    Para Term  AB Living   9 6 6 0 3 6   SAB IAB Ectopic Molar Multiple Live Births   1 0 0 0 0 6      # Outcome Date GA Lbr Mu/2nd Weight Sex Delivery Anes PTL Lv   9 Term 16 40w0d   F    FARHAN   8 Term 12 39w0d  7 lb 12 oz (3.515 kg) M Vag-Spont EPI N FARHAN      Name: Andrea Morfin: 8  Apgar5: 9   7 Term 09 39w1d  7 lb 9 oz (3.43 kg) M Vag-Spont EPI N FARHAN      Birth Comments: ABO INCOMPATABILITY, JAUNDICE BABY      Name: Aliza Cavanaugh: 8  Apgar5: 9   6 Term 08 39w0d  8 lb 8 oz (3.856 kg) M Vag-Spont EPI N FARHAN      Name: Jaison Falcon: 8  Apgar5: 9   5 SAB 2007              Birth Comments: 9 WEEKS   4 Term 01 40w0d  6 lb 15 oz (3.147 kg) M Vag-Spont EPI N FARHAN      Birth Comments: [address]      Name: Apryl Hatch   3 Term 97 37w0d  6 lb 14 oz (3.118 kg) M Vag-Spont None N FARHAN      Birth Comments: [address] [facility] [address]      Name: SANCHEZ   2 AB            1 AB                Past Medical History:   Diagnosis Date    Major depressive disorder, single episode, mild ([address] 10/18/2018       Past Surgical History:   Procedure Laterality Date    COLONOSCOPY  01/15/2021    COLONOSCOPY N/A 1/15/2021    COLORECTAL CANCER SCREENING, NOT HIGH RISK performed by Kizzy Mullen IV, DO at [address]  ,     SAB    LAPAROSCOPY  2006    lt.  ov cyst     OVARIAN CYST REMOVAL         Family History   Problem Relation Age of Onset    Other Mother         NON CANCEROUS TUMOR FROM BACK \"GOITER\" 20S    Other Cancer Mother         NON CANCEROUS TUMOR REMOVED FROM THROAT X2    Other Father         SKIN CA - MOLE REMOVED FROM FACE    Cancer Maternal Grandmother         KIDNEY, PART OF KIDNEY REMOVED    Other Maternal Grandmother         METASTATIC CANCER, LUNG PRIMARY, + TOB    Colon Cancer Paternal Grandmother         DX IN HER 70S, SX ONLY    Colon Cancer Paternal Grandfather         DX AND  IN 68, LUNG CA, STOMACH CA    Diabetes Paternal Grandfather     Colon Cancer Paternal Aunt         DX 54,  AT 62    Breast Cancer Paternal Aunt         20S    Eclampsia Neg Hx     Hypertension Neg Hx     Ovarian Cancer Neg Hx      Labor Neg Hx     Spont Abortions Neg Hx     Stroke Neg Hx        Social History     Socioeconomic History    Marital status:      Spouse name: Not on file    Number of children: Not on file    Years of education: Not on file    Highest education level: Not on file   Occupational History    Not on file   Tobacco Use    Smoking status: Former Smoker     Packs/day: 0.50     Years: 30.00     Pack years: 15.00     Types: Cigarettes     Start date:      Quit date: 2021     Years since quittin.3    Smokeless tobacco: Never Used    Tobacco comment: 23: Quit: 10/01/2011, Quit off and on over the years. Substance and Sexual Activity    Alcohol use: No    Drug use: No    Sexual activity: Yes     Partners: Male   Other Topics Concern    Not on file   Social History Narrative    Not on file     Social Determinants of Health     Financial Resource Strain:     Difficulty of Paying Living Expenses: Not on file   Food Insecurity:     Worried About Running Out of Food in the Last Year: Not on file    Abner of Food in the Last Year: Not on file   Transportation Needs:     Lack of Transportation (Medical): Not on file    Lack of Transportation (Non-Medical):  Not on file   Physical Activity:     Days of Exercise per Week: Not on file    Minutes of Exercise per Session: Not on file   Stress:     Feeling of Stress : Not on file   Social Connections:     Frequency of Communication with Friends and Family: Not on file    Frequency of Social Gatherings with Friends and Family: Not on file    Attends Buddhism Services: Not on file    Active Member of [address] [place] [address] or Organizations: Not on file    Attends Club or Organization Meetings: Not on file    Marital Status: Not on file   Intimate Partner Violence:     Fear of Current or Ex-Partner: Not on file    Emotionally Abused: Not on file    Physically Abused: Not on file    Sexually Abused: Not on file   Housing Stability:     Unable to Pay for Housing in the Last Year: Not on file    Number of [place] in the Last Year: Not on file    Unstable Housing in the Last Year: Not on file         MEDICATIONS:  Current Outpatient Medications   Medication Sig Dispense Refill    ibuprofen (ADVIL;MOTRIN) 800 MG tablet Take 800 mg by mouth every 6 hours as needed for Pain       No current facility-administered medications for this visit. ALLERGIES:  Allergies as of 05/03/2022 - Fully Reviewed 05/03/2022   Allergen Reaction Noted    Bactrim Hives 10/31/2011    Sulfa antibiotics Hives 05/11/2012         REVIEW OF SYSTEMS:       A minimum of an eleven point review of systems was completed. Review Of Systems (11 point):  Constitutional: No fever, chills or malaise;  No weight change or fatigue  Head and Eyes: No vision, Headache, Dizziness or trauma in last 12 months  ENT ROS: No hearing, Tinnitis, sinus or taste problems  Hematological and Lymphatic ROS:No Lymphoma, Von Willebrand's, Hemophillia or Bleeding History  Psych ROS: No Depression, Homicidal thoughts,suicidal thoughts, or anxiety  Breast ROS: No prior breast abnormalities or lumps  Respiratory ROS: No SOB, Pneumoniae,Cough, or Pulmonary Embolism History  Cardiovascular ROS: No Chest Pain with Exertion, Palpitations, Syncope, Edema, Arrhythmia  Gastrointestinal ROS: No Indigestion, Heartburn, Nausea, vomiting, Diarrhea, Constipation,or Bowel Changes; No Bloody Stools or melena  Genito-Urinary ROS: No Dysuria, Hematuria or Nocturia. No Urinary Incontinence or Vaginal Discharge  Musculoskeletal ROS: No Arthralgia, Arthritis,Gout,Osteoporosis or Rheumatism  Neurological ROS: No CVA, Migraines, Epilepsy, Seizure Hx, or Limb Weakness  Dermatological ROS: No Rash, Itching, Hives, Mole Changes or Cancer          Blood pressure 114/72, height 5' 3\" (1.6 m), weight 206 lb (93.4 kg), not currently breastfeeding. Abdomen: Soft non-tender; good bowel sounds. No guarding, rebound or rigidity. No CVA tenderness bilaterally. Extremities: No calf tenderness, DTR 2/4, and No edema bilaterally    Pelvic: Exam deferred. Diagnostics:  US NON OB TRANSVAGINAL    Result Date: 4/19/2022  GYNECOLOGY ULTRASOUND REPORT [facility]; [address] [phone] [place]n [phone] Fax 4/19/2022 MRN: [id number] Contact Serial #: [id number] Benedicto Godoy YOB: 1975 Age: 42 y.o. The ultrasound images were reviewed. Please see the attached ultrasound report. Ultrasonographer: Edwina Gutierrez RDMS Assessment: Benedicto Godoy is a 42 y.o. female Menorrhagia with irregular cycle Specific Ultrasound Imaging Obtained: Transabdominal Approach: Yes Transvaginal Approach: Yes Limitations of Study Encountered requiring Trans Vaginal imaging: Overlying bowel & gas limiting the study: Yes Poor prep for procedure limiting study: Yes Elevated BMI limiting study: No Ovaries are NOT seen on Transabdominal imaging or a Retroverted uterus is present. No Findings: 1. The Uterus is heterogeneous and anteverted (7.96 x 4.97 x 3.97 cm) 2. The Endometrial Stripe measurement is 0.28 cm 3. The Left Ovary is without masses.  There is a simple cyst identified (3.57 x 3.38 x 2.61) 4. The Right Ovary is without masses or cysts 5. There is not an abnormal amount of cul-de-sac fluid Electronically signed by Karrie Saldana DO on 4/19/22 at 1:11 PM EDT     1. The Uterus is heterogeneous and anteverted (7.96 x 4.97 x 3.97 cm) 2. The Endometrial Stripe measurement is 0.28 cm 3. The Left Ovary is without masses. There is a simple cyst identified (3.57 x 3.38 x 2.61)=No follow up required 4. The Right Ovary is without masses or cysts 5. There is not an abnormal amount of cul-de-sac fluid    US PELVIS COMPLETE    Result Date: 4/19/2022  See transvaginal ultrasound report from same day    SARAH AMRIT DIGITAL SCREEN BILATERAL    Result Date: 4/19/2022  EXAMINATION: SCREENING DIGITAL BILATERAL MAMMOGRAM WITH TOMOSYNTHESIS 4/19/2022 TECHNIQUE: Screening mammography was performed with tomosynthesis including MLO and CC views of the bilateral breasts. Computer aided detection was used for the interpretation of this exam. COMPARISON: Mammographic imaging with the most recent prior dated January 27, 2021. HISTORY: Screening. FINDINGS: There are scattered areas of fibroglandular density. There is no new dominant mass, suspicious microcalcification, or area of architectural distortion. A few benign calcifications are again apparent, unchanged. No mammographic evidence of malignancy. BIRADS: BIRADS - CATEGORY 1 Negative, no evidence of malignancy. Normal interval follow-up is recommended in 12 months. OVERALL ASSESSMENT - NEGATIVE A letter of notification will be sent to the patient regarding the results. The [employer] recommends annual mammograms for women 40 years and older.        Lab Results:  Results for orders placed or performed during the hospital encounter of 05/02/22   HCG, Quantitative, Pregnancy   Result Value Ref Range    hCG Quant 1 <5 mIU/mL   TSH with Reflex   Result Value Ref Range    TSH 1.30 0.30 - 5.00 uIU/mL   CBC Auto Differential Result Value Ref Range    WBC 5.1 3.5 - 11.0 k/uL    RBC 4.63 4.0 - 5.2 m/uL    Hemoglobin 13.7 12.0 - 16.0 g/dL    Hematocrit 40.6 36 - 46 %    MCV 87.8 80 - 100 fL    MCH 29.7 26 - 34 pg    MCHC 33.8 31 - 37 g/dL    RDW 13.0 11.5 - 14.9 %    Platelets 043 583 - 117 k/uL    MPV 9.8 6.0 - 12.0 fL    Seg Neutrophils 56 36 - 66 %    Lymphocytes 34 24 - 44 %    Monocytes 6 1 - 7 %    Eosinophils % 3 0 - 4 %    Basophils 1 0 - 2 %    Segs Absolute 2.90 1.3 - 9.1 k/uL    Absolute Lymph # 1.70 1.0 - 4.8 k/uL    Absolute Mono # 0.30 0.1 - 1.3 k/uL    Absolute Eos # 0.20 0.0 - 0.4 k/uL    Basophils Absolute 0.00 0.0 - 0.2 k/uL   Protime-INR   Result Value Ref Range    Protime 11.8 11.8 - 14.6 sec    INR 0.9    APTT   Result Value Ref Range    PTT 26.0 24.0 - 36.0 sec   Hemoglobin A1C   Result Value Ref Range    Hemoglobin A1C 4.9 4.0 - 6.0 %    Estimated Avg Glucose 94 mg/dL         Assessment:   Diagnosis Orders   1. Dysmenorrhea     2. Hot flashes     3. Irregular menses     4. Perimenopausal symptoms       Patient Active Problem List    Diagnosis Date Noted    Hernia, umbilical      Priority: Medium    SAB (spontaneous ) 10/31/2011     Priority: Low     sab x1      Major depressive disorder, single episode, mild ([facility] [address] 10/18/2018    Generalized anxiety disorder 10/18/2018    Recurrent pregnancy loss, antepartum condition or complication     Missed  2013           PLAN:  Return in about 1 year (around 5/3/2023) for Annual Exam.  Pt to keep with menstrual diary  Pt does not desire any further procedures/ treatment  Repeat Annual every 1 year  Cervical Cytology Evaluation begins at 24years old. If Negative Cytology, Follow-up screening per current guidelines. Counseled on preventative health maintenance follow-up.           The patient, Laila Holloway is a 42 y.o. female, was seen with a total time spent of 20 minutes for the visit on this date of service by the E/M provider. The time component had both face to face and non face to face time spent in determining the total time component. Counseling and education regarding her diagnosis listed below and her options regarding those diagnoses were also included in determining her time component. Diagnosis Orders   1. Dysmenorrhea     2. Hot flashes     3. Irregular menses     4. Perimenopausal symptoms          The patient had her preventative health maintenance recommendations and follow-up reviewed with her at the completion of her visit.

## 2023-06-20 ENCOUNTER — HOSPITAL ENCOUNTER (OUTPATIENT)
Age: 48
Setting detail: SPECIMEN
Discharge: HOME | End: 2023-06-20

## 2023-06-20 ENCOUNTER — OFFICE VISIT (OUTPATIENT)
Dept: OBGYN CLINIC | Age: 48
End: 2023-06-20
Payer: COMMERCIAL

## 2023-06-20 VITALS — BODY MASS INDEX: 36.49 KG/M2 | SYSTOLIC BLOOD PRESSURE: 100 MMHG | HEIGHT: 63 IN | DIASTOLIC BLOOD PRESSURE: 72 MMHG

## 2023-06-20 DIAGNOSIS — N91.2 AMENORRHEA: ICD-10-CM

## 2023-06-20 DIAGNOSIS — N76.0 ACUTE VAGINITIS: ICD-10-CM

## 2023-06-20 DIAGNOSIS — Z11.51 SPECIAL SCREENING EXAMINATION FOR HUMAN PAPILLOMAVIRUS (HPV): ICD-10-CM

## 2023-06-20 DIAGNOSIS — Z01.419 WELL FEMALE EXAM WITH ROUTINE GYNECOLOGICAL EXAM: Primary | ICD-10-CM

## 2023-06-20 DIAGNOSIS — Z12.31 ENCOUNTER FOR SCREENING MAMMOGRAM FOR MALIGNANT NEOPLASM OF BREAST: ICD-10-CM

## 2023-06-20 LAB
CANDIDA SPECIES, DNA PROBE: NEGATIVE
GARDNERELLA VAGINALIS, DNA PROBE: POSITIVE
SOURCE: ABNORMAL
TRICHOMONAS VAGINALIS DNA: NEGATIVE

## 2023-06-20 PROCEDURE — 99396 PREV VISIT EST AGE 40-64: CPT | Performed by: NURSE PRACTITIONER

## 2023-06-20 RX ORDER — PHENTERMINE HYDROCHLORIDE 37.5 MG/1
TABLET ORAL
COMMUNITY
Start: 2023-05-24

## 2023-06-20 RX ORDER — CIPROFLOXACIN 500 MG/1
TABLET, FILM COATED ORAL
COMMUNITY
Start: 2023-06-14

## 2023-06-20 NOTE — PROGRESS NOTES
10/18/2018         Patient Active Problem List    Diagnosis Date Noted    Hernia, umbilical      Priority: Medium    SAB (spontaneous ) 10/31/2011     Priority: Low     sab x1        Major depressive disorder, single episode, mild ([facility] [address] 10/18/2018    Generalized anxiety disorder 10/18/2018    Recurrent pregnancy loss, antepartum condition or complication     Missed  2013          Hereditary Breast, Ovarian, Colon and Uterine Cancer screening Done. Tobacco & Secondary smoke risks reviewed; instructed on cessation and avoidance      Counseling Completed:  Preventative Health Recommendations and Follow up. The patient was informed of the recommended preventative health recommendations. 1. Annuals every year; Cytology collections per prevailing guidelines. 2. Mammograms begin every year at 37 yo if no abnormalities are found and no family history. 3. Bone density studies every 2-3 years. Begin at 73 yo. If no fracture history or osteoporosis family history. (significant). 4. Colonoscopy begin at 38 yo. Repeat every ten years if negative and no family history. 5. Calcium of 4788-8554 mg/day in split dosing  6. Vitamin D 400-800 IU/day  7. All other preventative health recommendations will be managed by the patients Primary care physician. PLAN:  Return in about 1 year (around 2024) for annual.  To complete menopausal labs. If not postmenopausal, to do pelvic ultrasound and follow up with physician. Vaginal cultures collected. Repeat Annual every 1 year  Cervical Cytology Evaluation begins at 24years old. If Negative Cytology, Follow-up screening per current guidelines. Mammograms every 1 year. If 37 yo and last mammogram was negative. Calcium and Vitamin D dosing reviewed. Colonoscopy screening reviewed as well as onset for bone density testing. Birth control and barrier recommendations discussed.   STD counseling and prevention

## 2023-06-21 ENCOUNTER — TELEPHONE (OUTPATIENT)
Dept: OBGYN CLINIC | Age: 48
End: 2023-06-21

## 2023-06-21 LAB
C TRACH DNA SPEC QL PROBE+SIG AMP: NEGATIVE
N GONORRHOEA DNA SPEC QL PROBE+SIG AMP: NEGATIVE
SPECIMEN DESCRIPTION: NORMAL

## 2023-06-21 RX ORDER — METRONIDAZOLE 500 MG/1
500 TABLET ORAL 2 TIMES DAILY
Qty: 14 TABLET | Refills: 0 | Status: SHIPPED | OUTPATIENT
Start: 2023-06-21 | End: 2023-06-28

## 2023-06-21 NOTE — TELEPHONE ENCOUNTER
----- Message from SANJAY Cordero CNP sent at 6/20/2023  5:09 PM EDT -----  +BV- flagyl 500mg PO BID x7 days

## 2023-06-21 NOTE — TELEPHONE ENCOUNTER
Per Lionel Hill NP, pt notified of +BV; RX for flagyl to be sent to pt pharmacy. Pt verbalized understanding.

## 2023-06-26 LAB — CYTOLOGY REPORT: NORMAL

## 2023-07-25 ENCOUNTER — HOSPITAL ENCOUNTER (OUTPATIENT)
Dept: WOMENS IMAGING | Age: 48
Discharge: HOME | End: 2023-07-27
Payer: COMMERCIAL

## 2023-07-25 DIAGNOSIS — Z12.31 ENCOUNTER FOR SCREENING MAMMOGRAM FOR MALIGNANT NEOPLASM OF BREAST: ICD-10-CM

## 2023-07-25 DIAGNOSIS — Z01.419 WELL FEMALE EXAM WITH ROUTINE GYNECOLOGICAL EXAM: ICD-10-CM

## 2023-07-25 PROCEDURE — 77063 BREAST TOMOSYNTHESIS BI: CPT

## 2024-01-24 ENCOUNTER — APPOINTMENT (OUTPATIENT)
Dept: CT IMAGING | Age: 49
End: 2024-01-24
Payer: COMMERCIAL

## 2024-01-24 ENCOUNTER — HOSPITAL ENCOUNTER (EMERGENCY)
Age: 49
Discharge: HOME | End: 2024-01-24
Attending: EMERGENCY MEDICINE
Payer: COMMERCIAL

## 2024-01-24 VITALS
OXYGEN SATURATION: 99 % | WEIGHT: 187.39 LBS | BODY MASS INDEX: 33.2 KG/M2 | DIASTOLIC BLOOD PRESSURE: 70 MMHG | HEART RATE: 86 BPM | TEMPERATURE: 97.5 F | HEIGHT: 63 IN | RESPIRATION RATE: 18 BRPM | SYSTOLIC BLOOD PRESSURE: 106 MMHG

## 2024-01-24 DIAGNOSIS — R20.2 PARESTHESIA: ICD-10-CM

## 2024-01-24 DIAGNOSIS — R20.0 NUMBNESS: Primary | ICD-10-CM

## 2024-01-24 LAB
ANION GAP SERPL CALCULATED.3IONS-SCNC: 10 MMOL/L (ref 9–17)
BASOPHILS # BLD: 0 K/UL (ref 0–0.2)
BASOPHILS NFR BLD: 1 % (ref 0–2)
BUN SERPL-MCNC: 11 MG/DL (ref 6–20)
CALCIUM SERPL-MCNC: 9.3 MG/DL (ref 8.6–10.4)
CHLORIDE SERPL-SCNC: 105 MMOL/L (ref 98–107)
CO2 SERPL-SCNC: 24 MMOL/L (ref 20–31)
CREAT SERPL-MCNC: 0.9 MG/DL (ref 0.5–0.9)
EOSINOPHIL # BLD: 0.1 K/UL (ref 0–0.4)
EOSINOPHILS RELATIVE PERCENT: 2 % (ref 1–4)
ERYTHROCYTE [DISTWIDTH] IN BLOOD BY AUTOMATED COUNT: 13.1 % (ref 12.5–15.4)
GFR SERPL CREATININE-BSD FRML MDRD: >60 ML/MIN/1.73M2
GLUCOSE SERPL-MCNC: 96 MG/DL (ref 70–99)
HCT VFR BLD AUTO: 38.8 % (ref 36–46)
HGB BLD-MCNC: 13.2 G/DL (ref 12–16)
LYMPHOCYTES NFR BLD: 2.1 K/UL (ref 1–4.8)
LYMPHOCYTES RELATIVE PERCENT: 35 % (ref 24–44)
MCH RBC QN AUTO: 30.2 PG (ref 26–34)
MCHC RBC AUTO-ENTMCNC: 34.1 G/DL (ref 31–37)
MCV RBC AUTO: 88.5 FL (ref 80–100)
MONOCYTES NFR BLD: 0.4 K/UL (ref 0.1–1.2)
MONOCYTES NFR BLD: 7 % (ref 2–11)
NEUTROPHILS NFR BLD: 55 % (ref 36–66)
NEUTS SEG NFR BLD: 3.3 K/UL (ref 1.8–7.7)
PLATELET # BLD AUTO: 226 K/UL (ref 140–450)
PMV BLD AUTO: 9.6 FL (ref 6–12)
POTASSIUM SERPL-SCNC: 4.3 MMOL/L (ref 3.7–5.3)
RBC # BLD AUTO: 4.38 M/UL (ref 4–5.2)
SODIUM SERPL-SCNC: 139 MMOL/L (ref 135–144)
WBC OTHER # BLD: 6 K/UL (ref 3.5–11)

## 2024-01-24 PROCEDURE — 99284 EMERGENCY DEPT VISIT MOD MDM: CPT | Performed by: EMERGENCY MEDICINE

## 2024-01-24 PROCEDURE — 80048 BASIC METABOLIC PNL TOTAL CA: CPT

## 2024-01-24 PROCEDURE — 85025 COMPLETE CBC W/AUTO DIFF WBC: CPT

## 2024-01-24 PROCEDURE — 70450 CT HEAD/BRAIN W/O DYE: CPT

## 2024-01-24 RX ORDER — PHENTERMINE AND TOPIRAMATE 11.25; 69 MG/1; MG/1
1 CAPSULE, EXTENDED RELEASE ORAL DAILY
COMMUNITY

## 2024-01-24 RX ORDER — PREDNISONE 50 MG/1
50 TABLET ORAL DAILY
Qty: 5 TABLET | Refills: 0 | Status: SHIPPED | OUTPATIENT
Start: 2024-01-24 | End: 2024-01-29

## 2024-01-24 ASSESSMENT — PAIN - FUNCTIONAL ASSESSMENT: PAIN_FUNCTIONAL_ASSESSMENT: NONE - DENIES PAIN

## 2024-01-25 NOTE — ED PROVIDER NOTES
EMERGENCY DEPARTMENT ENCOUNTER      Pt Name: Colette Tracy  MRN: [id number]  Birthdate 1975  Date of evaluation: 1/24/2024  Provider: Yordy Farias PA-C    CHIEF COMPLAINT       Chief Complaint   Patient presents with    Numbness     Numbness that started Monday morning around 0800.  Pt noticed symptoms during showering.  Numbness to right leg, hip, buttocks and groin area. (Only right sided numbness, pt describes left side of groin has normal sensation)  Pt seen at [facility] yesterday and discharged home.          HISTORY OF PRESENT ILLNESS      Colette Tracy is a 48 y.o. female who presents to the emergency department via private auto with the complaints of right-sided numbness feeling that started couple days ago.  She denies any injury, denies any headaches, denies any double or blurry vision, denies any chest pain or shortness of breath fevers or chills.  Patient states that she went to [place] for vacation and when she returned she went back to work right away.  She states that over the past couple days she developed this weird sensation she noticed that in the shower.  She states that there is no tingling sensation but numbness feeling on her right lower extremity right buttock right groin area and now going up to her right shoulder blade.  Denies any numbness or tingling down her arms.      She went to another facility yesterday for the same complaint, she had a CT cervical thoracic and lumbar which did not show any acute abnormality.  She was discharged with possible medication side effect from her Topamax which she abruptly stop when she went to vacation.  She states that she did not come back and started again and this was cleared by her physician.        REVIEW OF SYSTEMS       Review of Systems   AS STATED IN HPI      PAST MEDICAL HISTORY     Past Medical History:   Diagnosis Date    Major depressive disorder, single episode, mild ([facility]) 10/18/2018 
in her toes with eyes closed. Patient also had chaperoned rectal exam chaperoned by Idalia ANGELO with appropriate rectal tone and no saddle anesthesia. Patient has brown stool. RLE is not swollen like triage note states. I did not note any pitting edema. No chest pain or SOB.    Imaging reviewed from [facility]. Patient did not have CT head, we will do this in our ED.     Imaging in the ED showed:  CT HEAD WO CONTRAST   Final Result   1. No acute intracranial abnormality.   2. Right maxillary partial bilateral ethmoid air-fluid levels may reflect   acute sinusitis.           Patient states that ethmoid air fluid levels is chronic for her and she does not have sinusitis, states that this occurs often.     Labs without leukocytosis or anemia.    Discussed at length with patient. Recommended and offered admission for neuro eval but rather would discharge. Patient may require outpatient EMG or MRI. Strict and specific return precautions given in the ED. Patient appears to have paresthesias. No skin changes. No cellulitis. No hemorrhagic bullae. No rashes or vesicles. No trauma noted to the back. Strength is intact.     We will discharge patient with steroids to see if it helps her symptoms will give neuro follow up. Strict and specific return precautions give. No headache. No neck stiffness. No fevers. No chills. Not immune surpressed.        PROCEDURES:  Procedures    Critical Care:  None      Discharge DISPOSITION Decision To Discharge 01/24/2024 11:39:50 PM    Patient was informed of their diagnosis and told to follow up with PCP in 2 days. Shared decision making was utilized in the discharge decision and patient/family in agreement with current plan of care. Patient told to return to ED for any worsening symptoms. Patient remains stable, will discharge home. They were given the opportunity to ask any questions regarding their care. These questions were answered to their satisfaction. Patient/family understands that early in

## 2024-01-25 NOTE — DISCHARGE INSTRUCTIONS
If you had imaging today, your results are:  CT HEAD WO CONTRAST   Final Result   1. No acute intracranial abnormality.   2. Right maxillary partial bilateral ethmoid air-fluid levels may reflect   acute sinusitis.             Take your medication as indicated and prescribed.  If you are given an antibiotic then, make sure you get the prescription filled and take the antibiotics until finished.      PLEASE RETURN TO THE EMERGENCY DEPARTMENT IMMEDIATELY if your symptoms worsen in anyway or in 8-12 hours if not improved for re-evaluation.  You should immediately return to the ER for symptoms such as increasing pain, bloody stool, fever, a feeling of passing out, light headed, dizziness, chest pain, shortness of breath, persistent nausea and/or vomiting, numbness or weakness to the arms or legs, coolness or color change of the arms or legs.      Please understand that at this time there is no evidence for a more serious underlying process, but that early in the process of an illness or injury, an emergency department workup can be falsely reassuring.  You should contact your family doctor within the next 24 hours for a follow up appointment. If you do not have one, we have attached the \"[facility] Same Day\" Physician line for you to call and they can provide you with one ([phone]). .    THANK YOU!    From [facility] and [facility]    On behalf of the Emergency Department staff at [facility], I would like to thank you for giving us the opportunity to address your health care needs and concerns.    We hope that during your visit, our service was delivered in a professional and caring manner. Please keep [facility] in mind as we walk with you down the path to your own personal wellness.     Please expect an automated text message or email from us so we can ask a few questions about your health and progress. Based on your answers, a clinician may call you back to offer help and

## 2024-01-29 ENCOUNTER — APPOINTMENT (OUTPATIENT)
Dept: GENERAL RADIOLOGY | Age: 49
DRG: 099 | End: 2024-01-29
Payer: COMMERCIAL

## 2024-01-29 ENCOUNTER — APPOINTMENT (OUTPATIENT)
Dept: MRI IMAGING | Age: 49
DRG: 099 | End: 2024-01-29
Payer: COMMERCIAL

## 2024-01-29 ENCOUNTER — HOSPITAL ENCOUNTER (INPATIENT)
Age: 49
LOS: 4 days | Discharge: HOME | DRG: 099 | End: 2024-02-02
Attending: EMERGENCY MEDICINE | Admitting: STUDENT IN AN ORGANIZED HEALTH CARE EDUCATION/TRAINING PROGRAM
Payer: COMMERCIAL

## 2024-01-29 DIAGNOSIS — M54.10 RADICULOPATHY, UNSPECIFIED SPINAL REGION: Primary | ICD-10-CM

## 2024-01-29 DIAGNOSIS — R20.0 NUMBNESS: ICD-10-CM

## 2024-01-29 DIAGNOSIS — R20.2 PARESTHESIAS: ICD-10-CM

## 2024-01-29 LAB
ANION GAP SERPL CALCULATED.3IONS-SCNC: 10 MMOL/L (ref 9–17)
BASOPHILS # BLD: 0 K/UL (ref 0–0.2)
BASOPHILS NFR BLD: 0 % (ref 0–2)
BUN SERPL-MCNC: 13 MG/DL (ref 6–20)
CALCIUM SERPL-MCNC: 9.6 MG/DL (ref 8.6–10.4)
CHLORIDE SERPL-SCNC: 105 MMOL/L (ref 98–107)
CO2 SERPL-SCNC: 26 MMOL/L (ref 20–31)
CREAT SERPL-MCNC: 0.8 MG/DL (ref 0.5–0.9)
CRP SERPL HS-MCNC: <3 MG/L (ref 0–5)
EOSINOPHIL # BLD: 0 K/UL (ref 0–0.4)
EOSINOPHILS RELATIVE PERCENT: 0 % (ref 1–4)
ERYTHROCYTE [DISTWIDTH] IN BLOOD BY AUTOMATED COUNT: 13 % (ref 12.5–15.4)
ERYTHROCYTE [SEDIMENTATION RATE] IN BLOOD BY PHOTOMETRIC METHOD: 4 MM/HR (ref 0–20)
GFR SERPL CREATININE-BSD FRML MDRD: >60 ML/MIN/1.73M2
GLUCOSE SERPL-MCNC: 101 MG/DL (ref 70–99)
HCT VFR BLD AUTO: 40.3 % (ref 36–46)
HGB BLD-MCNC: 13.8 G/DL (ref 12–16)
LYMPHOCYTES NFR BLD: 1.4 K/UL (ref 1–4.8)
LYMPHOCYTES RELATIVE PERCENT: 14 % (ref 24–44)
MAGNESIUM SERPL-MCNC: 2 MG/DL (ref 1.6–2.6)
MCH RBC QN AUTO: 30.3 PG (ref 26–34)
MCHC RBC AUTO-ENTMCNC: 34.2 G/DL (ref 31–37)
MCV RBC AUTO: 88.6 FL (ref 80–100)
MONOCYTES NFR BLD: 0.5 K/UL (ref 0.1–1.2)
MONOCYTES NFR BLD: 5 % (ref 2–11)
NEUTROPHILS NFR BLD: 81 % (ref 36–66)
NEUTS SEG NFR BLD: 7.8 K/UL (ref 1.8–7.7)
PLATELET # BLD AUTO: 216 K/UL (ref 140–450)
PMV BLD AUTO: 9.8 FL (ref 6–12)
POTASSIUM SERPL-SCNC: 3.5 MMOL/L (ref 3.7–5.3)
RBC # BLD AUTO: 4.54 M/UL (ref 4–5.2)
SEND OUT REPORT: NORMAL
SEND OUT REPORT: NORMAL
SODIUM SERPL-SCNC: 141 MMOL/L (ref 135–144)
TEST NAME: NORMAL
TEST NAME: NORMAL
TROPONIN I SERPL HS-MCNC: <6 NG/L (ref 0–14)
TSH SERPL DL<=0.05 MIU/L-ACNC: 0.61 UIU/ML (ref 0.3–5)
WBC OTHER # BLD: 9.7 K/UL (ref 3.5–11)

## 2024-01-29 PROCEDURE — 1200000000 HC SEMI PRIVATE

## 2024-01-29 PROCEDURE — 72148 MRI LUMBAR SPINE W/O DYE: CPT

## 2024-01-29 PROCEDURE — 84155 ASSAY OF PROTEIN SERUM: CPT

## 2024-01-29 PROCEDURE — 83516 IMMUNOASSAY NONANTIBODY: CPT

## 2024-01-29 PROCEDURE — 85025 COMPLETE CBC W/AUTO DIFF WBC: CPT

## 2024-01-29 PROCEDURE — 86235 NUCLEAR ANTIGEN ANTIBODY: CPT

## 2024-01-29 PROCEDURE — 84165 PROTEIN E-PHORESIS SERUM: CPT

## 2024-01-29 PROCEDURE — 99285 EMERGENCY DEPT VISIT HI MDM: CPT

## 2024-01-29 PROCEDURE — 72156 MRI NECK SPINE W/O & W/DYE: CPT

## 2024-01-29 PROCEDURE — 99255 IP/OBS CONSLTJ NEW/EST HI 80: CPT | Performed by: PSYCHIATRY & NEUROLOGY

## 2024-01-29 PROCEDURE — 84166 PROTEIN E-PHORESIS/URINE/CSF: CPT

## 2024-01-29 PROCEDURE — 72158 MRI LUMBAR SPINE W/O & W/DYE: CPT

## 2024-01-29 PROCEDURE — 36415 COLL VENOUS BLD VENIPUNCTURE: CPT

## 2024-01-29 PROCEDURE — 2580000003 HC RX 258: Performed by: STUDENT IN AN ORGANIZED HEALTH CARE EDUCATION/TRAINING PROGRAM

## 2024-01-29 PROCEDURE — 82746 ASSAY OF FOLIC ACID SERUM: CPT

## 2024-01-29 PROCEDURE — 86140 C-REACTIVE PROTEIN: CPT

## 2024-01-29 PROCEDURE — 2580000003 HC RX 258

## 2024-01-29 PROCEDURE — 6360000004 HC RX CONTRAST MEDICATION

## 2024-01-29 PROCEDURE — 99222 1ST HOSP IP/OBS MODERATE 55: CPT | Performed by: STUDENT IN AN ORGANIZED HEALTH CARE EDUCATION/TRAINING PROGRAM

## 2024-01-29 PROCEDURE — 99221 1ST HOSP IP/OBS SF/LOW 40: CPT | Performed by: NURSE PRACTITIONER

## 2024-01-29 PROCEDURE — 82607 VITAMIN B-12: CPT

## 2024-01-29 PROCEDURE — A9579 GAD-BASE MR CONTRAST NOS,1ML: HCPCS

## 2024-01-29 PROCEDURE — 84484 ASSAY OF TROPONIN QUANT: CPT

## 2024-01-29 PROCEDURE — 84156 ASSAY OF PROTEIN URINE: CPT

## 2024-01-29 PROCEDURE — 93005 ELECTROCARDIOGRAM TRACING: CPT

## 2024-01-29 PROCEDURE — 86431 RHEUMATOID FACTOR QUANT: CPT

## 2024-01-29 PROCEDURE — 71045 X-RAY EXAM CHEST 1 VIEW: CPT

## 2024-01-29 PROCEDURE — 82784 ASSAY IGA/IGD/IGG/IGM EACH: CPT

## 2024-01-29 PROCEDURE — 82390 ASSAY OF CERULOPLASMIN: CPT

## 2024-01-29 PROCEDURE — 80048 BASIC METABOLIC PNL TOTAL CA: CPT

## 2024-01-29 PROCEDURE — 84443 ASSAY THYROID STIM HORMONE: CPT

## 2024-01-29 PROCEDURE — 86780 TREPONEMA PALLIDUM: CPT

## 2024-01-29 PROCEDURE — 86225 DNA ANTIBODY NATIVE: CPT

## 2024-01-29 PROCEDURE — 82164 ANGIOTENSIN I ENZYME TEST: CPT

## 2024-01-29 PROCEDURE — 86038 ANTINUCLEAR ANTIBODIES: CPT

## 2024-01-29 PROCEDURE — 83036 HEMOGLOBIN GLYCOSYLATED A1C: CPT

## 2024-01-29 PROCEDURE — 85652 RBC SED RATE AUTOMATED: CPT

## 2024-01-29 PROCEDURE — 83735 ASSAY OF MAGNESIUM: CPT

## 2024-01-29 PROCEDURE — 86618 LYME DISEASE ANTIBODY: CPT

## 2024-01-29 RX ORDER — ACETAMINOPHEN 650 MG/1
650 SUPPOSITORY RECTAL EVERY 6 HOURS PRN
Status: DISCONTINUED | OUTPATIENT
Start: 2024-01-29 | End: 2024-02-02 | Stop reason: HOSPADM

## 2024-01-29 RX ORDER — MAGNESIUM SULFATE IN WATER 40 MG/ML
2000 INJECTION, SOLUTION INTRAVENOUS PRN
Status: DISCONTINUED | OUTPATIENT
Start: 2024-01-29 | End: 2024-02-02 | Stop reason: HOSPADM

## 2024-01-29 RX ORDER — SODIUM CHLORIDE 0.9 % (FLUSH) 0.9 %
5-40 SYRINGE (ML) INJECTION PRN
Status: DISCONTINUED | OUTPATIENT
Start: 2024-01-29 | End: 2024-02-02 | Stop reason: HOSPADM

## 2024-01-29 RX ORDER — SODIUM CHLORIDE 9 MG/ML
INJECTION, SOLUTION INTRAVENOUS PRN
Status: DISCONTINUED | OUTPATIENT
Start: 2024-01-29 | End: 2024-02-02 | Stop reason: HOSPADM

## 2024-01-29 RX ORDER — ASPIRIN 81 MG/1
81 TABLET, CHEWABLE ORAL DAILY
Status: DISCONTINUED | OUTPATIENT
Start: 2024-01-29 | End: 2024-02-02 | Stop reason: HOSPADM

## 2024-01-29 RX ORDER — SODIUM CHLORIDE 0.9 % (FLUSH) 0.9 %
10 SYRINGE (ML) INJECTION ONCE
Status: COMPLETED | OUTPATIENT
Start: 2024-01-29 | End: 2024-01-29

## 2024-01-29 RX ORDER — ONDANSETRON 2 MG/ML
4 INJECTION INTRAMUSCULAR; INTRAVENOUS EVERY 6 HOURS PRN
Status: DISCONTINUED | OUTPATIENT
Start: 2024-01-29 | End: 2024-02-02 | Stop reason: HOSPADM

## 2024-01-29 RX ORDER — POLYETHYLENE GLYCOL 3350 17 G/17G
17 POWDER, FOR SOLUTION ORAL DAILY PRN
Status: DISCONTINUED | OUTPATIENT
Start: 2024-01-29 | End: 2024-02-02 | Stop reason: HOSPADM

## 2024-01-29 RX ORDER — SODIUM CHLORIDE 0.9 % (FLUSH) 0.9 %
5-40 SYRINGE (ML) INJECTION EVERY 12 HOURS SCHEDULED
Status: DISCONTINUED | OUTPATIENT
Start: 2024-01-29 | End: 2024-02-02 | Stop reason: HOSPADM

## 2024-01-29 RX ORDER — POTASSIUM CHLORIDE 7.45 MG/ML
10 INJECTION INTRAVENOUS PRN
Status: DISCONTINUED | OUTPATIENT
Start: 2024-01-29 | End: 2024-02-02 | Stop reason: HOSPADM

## 2024-01-29 RX ORDER — POTASSIUM CHLORIDE 20 MEQ/1
40 TABLET, EXTENDED RELEASE ORAL PRN
Status: DISCONTINUED | OUTPATIENT
Start: 2024-01-29 | End: 2024-02-02 | Stop reason: HOSPADM

## 2024-01-29 RX ORDER — PHENTERMINE AND TOPIRAMATE 7.5; 46 MG/1; MG/1
7.5 CAPSULE, EXTENDED RELEASE ORAL
COMMUNITY
Start: 2023-12-05

## 2024-01-29 RX ORDER — ONDANSETRON 4 MG/1
4 TABLET, ORALLY DISINTEGRATING ORAL EVERY 8 HOURS PRN
Status: DISCONTINUED | OUTPATIENT
Start: 2024-01-29 | End: 2024-02-02 | Stop reason: HOSPADM

## 2024-01-29 RX ORDER — ENOXAPARIN SODIUM 100 MG/ML
40 INJECTION SUBCUTANEOUS DAILY
Status: ACTIVE | OUTPATIENT
Start: 2024-01-29 | End: 2024-01-31

## 2024-01-29 RX ORDER — ATORVASTATIN CALCIUM 40 MG/1
40 TABLET, FILM COATED ORAL NIGHTLY
Status: DISCONTINUED | OUTPATIENT
Start: 2024-01-29 | End: 2024-02-02 | Stop reason: HOSPADM

## 2024-01-29 RX ORDER — ACETAMINOPHEN 325 MG/1
650 TABLET ORAL EVERY 6 HOURS PRN
Status: DISCONTINUED | OUTPATIENT
Start: 2024-01-29 | End: 2024-02-02 | Stop reason: HOSPADM

## 2024-01-29 RX ADMIN — GADOTERIDOL 17 ML: 279.3 INJECTION, SOLUTION INTRAVENOUS at 15:31

## 2024-01-29 RX ADMIN — SODIUM CHLORIDE, PRESERVATIVE FREE 10 ML: 5 INJECTION INTRAVENOUS at 21:19

## 2024-01-29 RX ADMIN — SODIUM CHLORIDE, PRESERVATIVE FREE 10 ML: 5 INJECTION INTRAVENOUS at 15:31

## 2024-01-29 ASSESSMENT — ENCOUNTER SYMPTOMS
CONSTIPATION: 0
DIARRHEA: 0
VOMITING: 0
SORE THROAT: 0
SHORTNESS OF BREATH: 0
BACK PAIN: 0
COUGH: 0
NAUSEA: 0
BLOOD IN STOOL: 0
WHEEZING: 0
ABDOMINAL PAIN: 0
COLOR CHANGE: 0
TROUBLE SWALLOWING: 0

## 2024-01-29 NOTE — ED PROVIDER NOTES
[facility] EMERGENCY DEPARTMENT  EMERGENCY DEPARTMENT ENCOUNTER      Pt Name: Colette Tracy  MRN: [id number]  Birthdate 1975  Date of evaluation: 1/29/2024  Provider: Ann-Marie Mensah MD  2:57 PM    CHIEF COMPLAINT       Chief Complaint   Patient presents with    Numbness     Pt here with persistent numbness, has been seen twice for this in the ED, states it is getting progressively worse, states now struggling to use right arm and leg         HISTORY OF PRESENT ILLNESS    Colette Tracy is a 48 y.o. female who presents to the emergency department for persistent worsening numbness of right side.    HPI  Symptoms started about 8 days ago with numbness on right side in her vagina that went to pelvic area and right buttocks to her right leg, she went to [facility] on 1/23/2024 where they did CT scans lumbar region with result mid lower lumbar degenerative changes, CT scan thoracic with mild to moderate degenerative changes, CT scan C-spine-with advanced degenerative changes in cervical spine with multilevel stenosis.  Patient was discharged home  Patient's symptoms worsened to numbness of  previous areas and into right arm and came to [facility] emergency department on 1/24/2024 and at that time had CT scan head which showed right maxillary sinus partial bilateral ethmoid air-fluid levels-possible acute sinusitis-Per note patient had been given choice to be admitted for neurology workup or discharged home with close follow-up with PCP and neurology.  Patient was discharged to home, and referred to Dr. Gore, [facility] neurology and Dr. Soledad caldwell neurology.-Per note may require outpatient EMG or MRIs.  Patient was prescribed steroids on that day.  -Patient saw her PCP Dr. Hobson on Friday 1/26/2024 who tried to get patient seen with [facility] neurology was unable to get her right in urgently.    -Patient's symptoms continue to worsen-today she feels numbness of

## 2024-01-29 NOTE — CONSULTS
NEUROLOGY INPATIENT CONSULT NOTE          1/29/2024      Reason for consult: Right-sided numbness 8 days  Requesting physician: Ann-Marie Mensah      Chief complaint: Numbness      HPI: I had the pleasure of seeing your patient in neurology consultation. As you would recall Colette Tracy is a  48 y.o. 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 Pt reported waking up on 1/22 in her usual state of health. While taking a shower she felt new onset numbness to R half of her private area. She denied urinary & fecal incontinence; she feels the usual urge to urinate & having BM. On 1/23 numbness spread from R ankle, up to whole RLE, R hip, buttock, groin up till her waist. Next day she felt numbness to her R foot & up R side of rib cage, front & back, till under R breast. On 1/24 her R palm, R arm-pit & R breast felt numb. That day she felt RLE weakness as she was unable to walk without assistance of a cane. Denied any falls. She described numbness as \"when you feel like your mouth is swollen & heavy after dental surgery\". On 1/26 she wasn't able to  things with R hand due to weakness & she couldn't go to work. Over the weekend she noted \"slurred speech\". Today she noted unintentional \"jumbled-up words\". No numbness to dorsal surface of R hand, lateral & posterior surface of R upper arm and R shoulder. She feels like these areas will be getting numb sooner or later. She reported \"feeling cold always\", however felt R hand & R foot were more cold. Otherwise didn't feel any difference in water temperature while showering. Denied any issues on R side of face or neck & L side of body. Pt denied any history of neck or back injury, fever, chills, cough, sore throat, UTI, N/V or diarrhea. Denied any headaches (other than her usual ones), blurred or double vision or memory issues. She  Negative for hemoptysis and sputum    Cardiovascular  Negative for orthopnea, claudication and PND    Gastrointestinal  Negative for abdominal pain, diarrhea, blood in stool    Musculoskeletal  + Neck soreness & low back pain    Skin  + Paresthesias   Endo/heme/allergies  Negative for polydipsia, environmental allergy    Psychiatric/behavioral  Negative for suicidal ideation. Patient is not anxious        Medications:      PRN Meds include:     Objective:   /71   Pulse 75   Temp 97.3 °F (36.3 °C) (Oral)   Resp 16   Ht 1.575 m (5' 2\")   Wt 84.8 kg (187 lb)   LMP 2021 (Approximate)   SpO2 100%   BMI 34.20 kg/m²     Blood pressure range: Systolic (24hrs), Av , Min:116 , Max:116   ; Diastolic (24hrs), Av, Min:71, Max:71      General examination:   Head: Normocephalic, atraumatic  Eyes: Extraocular movements intact  Lungs: Respirations unlabored  ENT: Normal external ear canals, no sinus tenderness  Heart: Regular rate rhythm  Abdomen: No masses, tenderness  Extremities: No cyanosis or edema  Skin: Intact, normal skin color      NEUROLOGIC EXAMINATION  GENERAL  Appears comfortable and in no distress   HEENT  NC/ AT   NECK  Supple and no bruits heard   MENTAL STATUS:  Alert, oriented, intact memory, no confusion, normal speech, normal language, no hallucination or delusion   CRANIAL NERVES: II     -      Visual fields intact to confrontation  III,IV,VI -  EOMs full, no afferent defect, no CHARANJIT, no ptosis  V     -     Normal facial sensation  VII    -     Normal facial symmetry  VIII   -     Intact hearing  IX,X -     Symmetrical palate  XI    -     Symmetrical shoulder shrug  XII   -     Midline tongue, no atrophy    MOTOR FUNCTION:  Pt required encouragement to check for motor strength    Strength: Almost equal b/l hand    RUE 4+/5; RLE 4-/5   Slow movements RUE & RLE   Normal bulk & normal tone    SENSORY FUNCTION:  Diminished sensation RLE, R torso roughly till T3 level   Dysesthesia R

## 2024-01-29 NOTE — H&P
Troponin    Collection Time: 01/29/24  1:14 PM   Result Value Ref Range    Troponin, High Sensitivity <6 0 - 14 ng/L   EKG 12 Lead (Chest Pain)    Collection Time: 01/29/24  4:55 PM   Result Value Ref Range    Ventricular Rate 65 BPM    Atrial Rate 65 BPM    P-R Interval 142 ms    QRS Duration 84 ms    Q-T Interval 404 ms    QTc Calculation (Bazett) 420 ms    P Axis 32 degrees    R Axis 25 degrees    T Axis 20 degrees       Imaging/Diagnostics:  MRI CERVICAL SPINE W WO CONTRAST    Result Date: 1/29/2024  1. There is a focus of T2 hyperintensity within the dorsal cord at C2 with associated contrast enhancement.  This may represent transverse myelitis or perhaps an area of demyelination.  A cord neoplasm is felt to be less likely given the lack of extensive surrounding edema. 2. Degenerative changes contribute to moderate spinal canal stenosis at C4-C5 mild-to-moderate at C3-C4 and mild stenosis at C5-C6 as well as C6-C7. 3. Neural foraminal narrowing at C3-C4 through C5-C6 as above. 4. Loss of disc space height with endplate irregularity as well as Modic type 1 degenerative endplate changes at C3-C4.     XR CHEST PORTABLE    Result Date: 1/29/2024  No acute cardiopulmonary disease.     CT HEAD WO CONTRAST    Result Date: 1/24/2024  1. No acute intracranial abnormality. 2. Right maxillary partial bilateral ethmoid air-fluid levels may reflect acute sinusitis.       Assessment :      Hospital Problems             Last Modified POA    * (Principal) Right sided numbness 1/29/2024 Yes       Plan:     Patient status inpatient in the Med/Surge    Right-sided numbness, MRI brain MRI thoracic and lumbar ordered, will start aspirin and statin, monitor on telemetry, neurology was consulted, correct electrolyte abnormalities  Hypokalemia replace and monitor  DVT prophylaxis  PT-OT    Consultations:   IP CONSULT TO NEUROLOGY    Patient is admitted as inpatient status because of co-morbidities listed above, severity of signs and

## 2024-01-29 NOTE — ED PROVIDER NOTES
I performed a history and physical examination of the patient and discussed management with the resident. I reviewed the resident’s note and agree with the documented findings and plan of care. Any areas of disagreement are noted on the chart. I was personally present for the key portions of any procedures. I have documented in the chart those procedures where I was not present during the key portions. I have reviewed the emergency nurses triage note. I agree with the chief complaint, past medical history, past surgical history, allergies, medications, social and family history as documented unless otherwise noted below. Documentation of the HPI, Physical Exam and Medical Decision Making performed by medical students or scribes is based on my personal performance of the HPI, PE and MDM. For Phys Assistant/ Nurse Practitioner cases/documentation I have personally evaluated this patient and have completed at least one if not all key elements of the E/M (history, physical exam, and MDM). Additional findings are as noted.        CHIEF COMPLAINT       Chief Complaint   Patient presents with    Numbness     Pt here with persistent numbness, has been seen twice for this in the ED, states it is getting progressively worse, states now struggling to use right arm and leg         PAST MEDICAL HISTORY    has a past medical history of Major depressive disorder, single episode, mild (HCC).    SURGICAL HISTORY      has a past surgical history that includes laparoscopy (12/2006); ovarian cyst removal (2006); Dilation & curettage (2013, 2014); Colonoscopy (01/15/2021); Colonoscopy (N/A, 01/15/2021); and Thyroidectomy, partial (Right, 09/2022).    CURRENT MEDICATIONS       Previous Medications    CIPROFLOXACIN (CIPRO) 500 MG TABLET        IBUPROFEN (ADVIL;MOTRIN) 800 MG TABLET    Take 1 tablet by mouth every 6 hours as needed for Pain    PHENTERMINE (ADIPEX-P) 37.5 MG TABLET    1 TABLET BEFORE BREAKFAST ORALLY ONCE A DAY 30 DAYS     DO  01/29/24 1456

## 2024-01-30 ENCOUNTER — APPOINTMENT (OUTPATIENT)
Dept: CT IMAGING | Age: 49
DRG: 099 | End: 2024-01-30
Payer: COMMERCIAL

## 2024-01-30 ENCOUNTER — HOSPITAL ENCOUNTER (OUTPATIENT)
Dept: INTERVENTIONAL RADIOLOGY/VASCULAR | Age: 49
Discharge: HOME | End: 2024-02-01
Payer: COMMERCIAL

## 2024-01-30 ENCOUNTER — APPOINTMENT (OUTPATIENT)
Dept: MRI IMAGING | Age: 49
DRG: 099 | End: 2024-01-30
Payer: COMMERCIAL

## 2024-01-30 VITALS
DIASTOLIC BLOOD PRESSURE: 63 MMHG | SYSTOLIC BLOOD PRESSURE: 103 MMHG | RESPIRATION RATE: 18 BRPM | HEART RATE: 84 BPM | OXYGEN SATURATION: 100 %

## 2024-01-30 PROBLEM — R93.7 ABNORMAL MRI, CERVICAL SPINE: Status: ACTIVE | Noted: 2024-01-30

## 2024-01-30 PROBLEM — M54.10 RADICULOPATHY: Status: ACTIVE | Noted: 2024-01-30

## 2024-01-30 PROBLEM — G37.3 TRANSVERSE MYELITIS (HCC): Status: ACTIVE | Noted: 2024-01-30

## 2024-01-30 LAB
ACE SERPL-CCNC: 23 U/L (ref 8–52)
ALBUMIN CSF-MCNC: 197 MG/L (ref 70–350)
ALBUMIN INDEX: 45.8
ALBUMIN SERPL-MCNC: 4.3 G/DL (ref 3.5–5.2)
ANION GAP SERPL CALCULATED.3IONS-SCNC: 9 MMOL/L (ref 9–17)
APPEARANCE CSF: CLEAR
BUN SERPL-MCNC: 15 MG/DL (ref 6–20)
C GATTII+NEOFOR DNA CSF QL NAA+NON-PROBE: NOT DETECTED
CALCIUM SERPL-MCNC: 8.6 MG/DL (ref 8.6–10.4)
CERULOPLASMIN SERPL-MCNC: 21 MG/DL (ref 16–45)
CHLORIDE SERPL-SCNC: 108 MMOL/L (ref 98–107)
CHOLEST SERPL-MCNC: 141 MG/DL (ref 0–199)
CHOLESTEROL/HDL RATIO: 3
CMV DNA CSF QL NAA+NON-PROBE: NOT DETECTED
CO2 SERPL-SCNC: 25 MMOL/L (ref 20–31)
CREAT SERPL-MCNC: 0.8 MG/DL (ref 0.5–0.9)
E COLI K1 DNA CSF QL NAA+NON-PROBE: NOT DETECTED
EKG ATRIAL RATE: 65 BPM
EKG P AXIS: 32 DEGREES
EKG P-R INTERVAL: 142 MS
EKG Q-T INTERVAL: 404 MS
EKG QRS DURATION: 84 MS
EKG QTC CALCULATION (BAZETT): 420 MS
EKG R AXIS: 25 DEGREES
EKG T AXIS: 20 DEGREES
EKG VENTRICULAR RATE: 65 BPM
EST. AVERAGE GLUCOSE BLD GHB EST-MCNC: 97 MG/DL
EV RNA CSF QL NAA+NON-PROBE: NOT DETECTED
FOLATE SERPL-MCNC: 8.2 NG/ML (ref 4.8–24.2)
GFR SERPL CREATININE-BSD FRML MDRD: >60 ML/MIN/1.73M2
GLUCOSE CSF-MCNC: 64 MG/DL (ref 40–70)
GLUCOSE SERPL-MCNC: 95 MG/DL (ref 70–99)
GP B STREP DNA CSF QL NAA+NON-PROBE: NOT DETECTED
HAEM INFLU DNA CSF QL NAA+NON-PROBE: NOT DETECTED
HBA1C MFR BLD: 5 % (ref 4–6)
HDLC SERPL-MCNC: 43 MG/DL
HHV6 DNA CSF QL NAA+NON-PROBE: NOT DETECTED
HSV1 DNA CSF QL NAA+NON-PROBE: NOT DETECTED
HSV2 DNA CSF QL NAA+NON-PROBE: NOT DETECTED
IGG CSF-MCNC: 2.1 MG/DL (ref 0.5–7)
IGG INDEX CSF: 0.51
IGG SERPL-MCNC: 903 MG/DL (ref 700–1600)
IGG SYNTHESIS RATE CSF: < 3.3 MG/24 H
L MONOCYTOG DNA CSF QL NAA+NON-PROBE: NOT DETECTED
LDLC SERPL CALC-MCNC: 81 MG/DL (ref 0–100)
LYMPHOCYTES NFR CSF: 100 %
N MEN DNA CSF QL NAA+NON-PROBE: NOT DETECTED
NEUTROPHILS NFR CSF: 0 %
NUC CELL # FLD MANUAL: 7 CELLS/UL
OLIGOCLONAL BANDS: ABNORMAL
PARECHOVIRUS A RNA CSF QL NAA+NON-PROBE: NOT DETECTED
POTASSIUM SERPL-SCNC: 3.6 MMOL/L (ref 3.7–5.3)
PROT CSF-MCNC: 34 MG/DL (ref 15–45)
RBC # FLD MANUAL: 2 CELLS/UL
RHEUMATOID FACT SER NEPH-ACNC: <10 IU/ML
S PNEUM DNA CSF QL NAA+NON-PROBE: NOT DETECTED
SEND OUT REPORT: NORMAL
SODIUM SERPL-SCNC: 142 MMOL/L (ref 135–144)
SPECIMEN DESCRIPTION: NORMAL
SPECIMEN VOL CSF: 8 ML
T PALLIDUM AB SER QL IA: NONREACTIVE
TEST NAME: NORMAL
TRIGL SERPL-MCNC: 83 MG/DL
TUBE # CSF: 3
VIT B12 SERPL-MCNC: 519 PG/ML (ref 232–1245)
VLDLC SERPL CALC-MCNC: 17 MG/DL
VZV DNA CSF QL NAA+NON-PROBE: NOT DETECTED
XANTHOCHROMIA CSF QL: ABNORMAL

## 2024-01-30 PROCEDURE — 82784 ASSAY IGA/IGD/IGG/IGM EACH: CPT

## 2024-01-30 PROCEDURE — 72157 MRI CHEST SPINE W/O & W/DYE: CPT

## 2024-01-30 PROCEDURE — 6360000004 HC RX CONTRAST MEDICATION: Performed by: NURSE PRACTITIONER

## 2024-01-30 PROCEDURE — 88108 CYTOPATH CONCENTRATE TECH: CPT

## 2024-01-30 PROCEDURE — A9579 GAD-BASE MR CONTRAST NOS,1ML: HCPCS | Performed by: NURSE PRACTITIONER

## 2024-01-30 PROCEDURE — 87070 CULTURE OTHR SPECIMN AEROBIC: CPT

## 2024-01-30 PROCEDURE — 82042 OTHER SOURCE ALBUMIN QUAN EA: CPT

## 2024-01-30 PROCEDURE — 70553 MRI BRAIN STEM W/O & W/DYE: CPT

## 2024-01-30 PROCEDURE — 62328 DX LMBR SPI PNXR W/FLUOR/CT: CPT

## 2024-01-30 PROCEDURE — 97162 PT EVAL MOD COMPLEX 30 MIN: CPT

## 2024-01-30 PROCEDURE — 80048 BASIC METABOLIC PNL TOTAL CA: CPT

## 2024-01-30 PROCEDURE — 2580000003 HC RX 258: Performed by: NURSE PRACTITIONER

## 2024-01-30 PROCEDURE — 86592 SYPHILIS TEST NON-TREP QUAL: CPT

## 2024-01-30 PROCEDURE — 83916 OLIGOCLONAL BANDS: CPT

## 2024-01-30 PROCEDURE — 82040 ASSAY OF SERUM ALBUMIN: CPT

## 2024-01-30 PROCEDURE — 82525 ASSAY OF COPPER: CPT

## 2024-01-30 PROCEDURE — 009U3ZX DRAINAGE OF SPINAL CANAL, PERCUTANEOUS APPROACH, DIAGNOSTIC: ICD-10-PCS | Performed by: RADIOLOGY

## 2024-01-30 PROCEDURE — 97535 SELF CARE MNGMENT TRAINING: CPT

## 2024-01-30 PROCEDURE — 80061 LIPID PANEL: CPT

## 2024-01-30 PROCEDURE — 70450 CT HEAD/BRAIN W/O DYE: CPT

## 2024-01-30 PROCEDURE — 87483 CNS DNA AMP PROBE TYPE 12-25: CPT

## 2024-01-30 PROCEDURE — 97166 OT EVAL MOD COMPLEX 45 MIN: CPT

## 2024-01-30 PROCEDURE — 36415 COLL VENOUS BLD VENIPUNCTURE: CPT

## 2024-01-30 PROCEDURE — 2580000003 HC RX 258: Performed by: STUDENT IN AN ORGANIZED HEALTH CARE EDUCATION/TRAINING PROGRAM

## 2024-01-30 PROCEDURE — 86618 LYME DISEASE ANTIBODY: CPT

## 2024-01-30 PROCEDURE — 97116 GAIT TRAINING THERAPY: CPT

## 2024-01-30 PROCEDURE — 82945 GLUCOSE OTHER FLUID: CPT

## 2024-01-30 PROCEDURE — 87205 SMEAR GRAM STAIN: CPT

## 2024-01-30 PROCEDURE — 89051 BODY FLUID CELL COUNT: CPT

## 2024-01-30 PROCEDURE — 1200000000 HC SEMI PRIVATE

## 2024-01-30 PROCEDURE — 99232 SBSQ HOSP IP/OBS MODERATE 35: CPT | Performed by: HOSPITALIST

## 2024-01-30 PROCEDURE — 84157 ASSAY OF PROTEIN OTHER: CPT

## 2024-01-30 PROCEDURE — 83873 ASSAY OF CSF PROTEIN: CPT

## 2024-01-30 PROCEDURE — 87327 CRYPTOCOCCUS NEOFORM AG IA: CPT

## 2024-01-30 PROCEDURE — 99233 SBSQ HOSP IP/OBS HIGH 50: CPT | Performed by: PSYCHIATRY & NEUROLOGY

## 2024-01-30 PROCEDURE — 70498 CT ANGIOGRAPHY NECK: CPT

## 2024-01-30 RX ORDER — SODIUM CHLORIDE 0.9 % (FLUSH) 0.9 %
10 SYRINGE (ML) INJECTION PRN
Status: COMPLETED | OUTPATIENT
Start: 2024-01-30 | End: 2024-01-31

## 2024-01-30 RX ORDER — 0.9 % SODIUM CHLORIDE 0.9 %
80 INTRAVENOUS SOLUTION INTRAVENOUS ONCE
Status: COMPLETED | OUTPATIENT
Start: 2024-01-31 | End: 2024-01-31

## 2024-01-30 RX ORDER — SODIUM CHLORIDE 0.9 % (FLUSH) 0.9 %
10 SYRINGE (ML) INJECTION PRN
Status: DISCONTINUED | OUTPATIENT
Start: 2024-01-30 | End: 2024-02-02 | Stop reason: HOSPADM

## 2024-01-30 RX ADMIN — SODIUM CHLORIDE, PRESERVATIVE FREE 10 ML: 5 INJECTION INTRAVENOUS at 10:59

## 2024-01-30 RX ADMIN — SODIUM CHLORIDE, PRESERVATIVE FREE 10 ML: 5 INJECTION INTRAVENOUS at 21:41

## 2024-01-30 RX ADMIN — GADOTERIDOL 17 ML: 279.3 INJECTION, SOLUTION INTRAVENOUS at 10:59

## 2024-01-30 RX ADMIN — SODIUM CHLORIDE, PRESERVATIVE FREE 10 ML: 5 INJECTION INTRAVENOUS at 09:53

## 2024-01-30 NOTE — PROGRESS NOTES
Patient to IR for lumbar puncture.  Dr. Santana and  RT at bedside.  Site prepped and draped, area numbed with lidocaine.  9mls of clear fluid obtained.  Specimens collected.  Band aid placed to site.  Patient tolerated well. Report called to Zoey ANGELO.

## 2024-01-30 NOTE — PROGRESS NOTES
Physical Therapy  Facility/Department: [address]  Physical Therapy Initial Assessment    Name: Colette Tracy  : 1975  MRN: [id number]  Date of Service: 2024  Chief Complaint   Patient presents with    Numbness     Pt here with persistent numbness, has been seen twice for this in the ED, states it is getting progressively worse, states now struggling to use right arm and leg         Discharge Recommendations:  Patient would benefit from continued therapy after discharge   PT Equipment Recommendations  Equipment Needed: Yes  Mobility Devices: Walker  Walker: Rolling      Patient Diagnosis(es): The primary encounter diagnosis was Radiculopathy, unspecified spinal region. Diagnoses of Numbness and Paresthesias were also pertinent to this visit.  Past Medical History:  has a past medical history of Major depressive disorder, single episode, mild (HCC).  Past Surgical History:  has a past surgical history that includes laparoscopy (2006); ovarian cyst removal (); Dilation & curettage (, ); Colonoscopy (01/15/2021); Colonoscopy (N/A, 01/15/2021); and Thyroidectomy, partial (Right, 2022).    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 She lives with  and 4 children in 2 story home with basement. She currently has numbness R side of body at level of R shoulder and distal and reports 10/10 pain powers R hand and R foot. She is modified indep for bed mobility and transfers with RW but supervision for gait 50ft with RW. She did ambulate 15 ft without device with SBA but antalgic gait pattern with fall risk. Pt is safe to return home, but will need rolling walker and will benefit from continued PT.  Flexion: 3-/5  R Knee Flexion: 4+/5  R Knee Extension: 4+/5  R Ankle Dorsiflexion: 3+/5  R Ankle Plantar flexion: 4/5  Strength LLE  Strength LLE: WNL  Strength RUE  Strength RUE: Exception  Comment: R wrist extension and hand  much time and effort to complete with  grossly 4/5  R Shoulder Flexion: 4+/5  R Elbow Flexion:  (5(-)/5)  R Elbow Extension:  (5(-)/5)  R Wrist Extension: 4/5  Strength LUE  Strength LUE: WNL           Bed mobility  Supine to Sit: Modified independent  Sit to Supine: Modified independent  Scooting: Modified independent  Bed Mobility Comments: decreased use R hand/UE and has IV L wrist/hand; increased time/effort simulate pt high bed but pt able to compensate  Transfers  Sit to Stand: Modified independent  Stand to Sit: Modified independent  Comment: education hand placement safety RW and to position R hand and use as tolerable; current R palmar hand pain limit use pushing up from bed and gripping RW but pt able to compensate  Ambulation  Surface: Level tile  Device: Rolling Walker  Other Apparatus:  (portable moniter)  Assistance: Supervision  Quality of Gait: WBAT R LE sequence for pain control and due to numbness R LE, especially R foot; education cues upright trunk posture  Gait Deviations: Slow Alisha;Decreased step length;Decreased step height  Distance: 50ft  Comments: education gait best with RW at all times currently for pain control, safety and balance while decreased sensation and to promote normal gait pattern as symptoms improve  More Ambulation?: Yes  Ambulation 2  Surface - 2: level tile  Device 2:  (gait belt)  Other Apparatus 2:  (portable moniter)  Assistance 2: Stand by assistance  Quality of Gait 2: severely slow antalgic gait R LE with increased lateral trunk deviation  Gait Deviations: Slow Alisha;Decreased step length;Decreased step height  Distance: 15ft  Comments: trial gait for assessment and education; recomend RW currently     Balance  Posture:

## 2024-01-30 NOTE — PROGRESS NOTES
[facility]  Office: [phone]  Luke Hollingsworth DO, Edmundo Beckford DO, Maksim Green DO, Russ Cedillo DO, Jamila Watt MD, Liliam Meehan MD, Juan Luis Cruz MD, Eve Cole MD,  Tu Robles MD, Deon Membreno MD, Sukumar Flood MD,  Chelsey Neves DO, Andre Frost MD, Scott Izaguirre MD, Cuauhtemoc Hollingsworth DO, Jaimie Martinez MD,  Yahir Jernigan DO, Lesly Saldana MD, Breanne Lara MD, Caitlin Harrell MD, Allison Salmeron MD,  Andrew Du MD, Chirag Saez MD, Giovanni Guillen MD, Juwan Dan MD, Neal Cottrell MD, Wojciech Tolbert MD, Mario Avery DO, Jaswant Walsh DO, Mahin Wynn MD,  Antony Maldonado MD, Shirley Waterhouse, CNP,  Bharati Van, CNP, Manjit Chaudhry, CNP,  Tana Godoy, DNP, Payal Weiss, CNP, Taylor Harper, CNP, Kimberley Jensen CNP, Luzma Russ, CNP, Swapna Polanco, CNP, Su Mitchell, PA-C, Kaylene Cedeño PA-C, Marlyn Cortes, CNP, Dina Alcantara, CNS, Namrata Call, CNP, Estephanie Raya, CNP, Tracy Schwab, CNP         [facility]   IN-PATIENT SERVICE   [facility]    Progress Note    1/30/2024    11:43 AM    Name:   Colette Tracy  MRN:     [id number]     Acct:      [id number]   Room:   310/310-01   Day:  1  Admit Date:  1/29/2024 12:32 PM    PCP:   Manjit Hobson MD  Code Status:  Full Code    Subjective:     Patient seen in follow-up for right upper extremity weakness, patient states \"I arm is not working and my memory is off\"    Imaging studies reviewed with the patient.  I had a very long discussion with her regarding the demyelinating process noted within her cervical spine.  Given her age,  descent, gender and imaging findings this is concerning for multiple sclerosis.  I explained multiple sclerosis and the pathogenesis behind it.  I did discuss the fact that further workup is required including imaging studies along with lumbar puncture.  We discussed that oligoclonal banding is pathognomonic for MS however the absence

## 2024-01-30 NOTE — PROGRESS NOTES
Occupational Therapy  Facility/Department: [address]  Occupational Therapy Initial Assessment    Name: Colette Tracy  : 1975  MRN: [id number]  Date of Service: 2024    Discharge Recommendations:  Patient would benefit from continued therapy after discharge, Outpatient OT  OT Equipment Recommendations  Equipment Needed: No       Patient Diagnosis(es): The primary encounter diagnosis was Radiculopathy, unspecified spinal region. Diagnoses of Numbness and Paresthesias were also pertinent to this visit.  Past Medical History:  has a past medical history of Major depressive disorder, single episode, mild (HCC).  Past Surgical History:  has a past surgical history that includes laparoscopy (2006); ovarian cyst removal (); Dilation & curettage (, ); Colonoscopy (01/15/2021); Colonoscopy (N/A, 01/15/2021); and Thyroidectomy, partial (Right, 2022).           Assessment   Performance deficits / Impairments: Decreased functional mobility ;Decreased ADL status;Decreased sensation;Decreased ROM;Decreased balance;Decreased strength;Decreased fine motor control;Decreased coordination  Assessment: admit with R side numbness. Pt presents with the above limitations that affect patient ability to complete FMC and gross motor coordination activities for self care tasks. Pt would benefit from skilled OT here at hospital and at OP for OT to focus on FMC and GMC of her R side to support return to WNL skills for these tasks. Pt should be safe to return to Prior living environment.  Prognosis: Good  Decision Making: Medium Complexity  REQUIRES OT FOLLOW-UP: Yes  Activity Tolerance  Activity Tolerance: Patient Tolerated treatment well        Plan   Occupational Therapy Plan  Times Per Week: 5-6x/wk  Current Treatment Recommendations: Strengthening, ROM, Balance training, Safety education & training, Patient/Caregiver education & training, Equipment evaluation, education, & procurement, Self-Care / ADL, Home  time/effort simulate pt high bed but pt able to compensate  Transfers  Sit to stand: Contact guard assistance;Stand by assistance  Stand to sit: Contact guard assistance;Stand by assistance  Transfer Comments: cues for safe techniques, cues to enggage R side  Vision  Vision: Impaired  Vision Exceptions:  (bifocals with stygmatism; wear at night with driving)  Hearing  Hearing: Within functional limits  Cognition  Overall Cognitive Status: WNL  Cognition Comment: Pt needs encouragement to use R side 1st and then use L( pt R side dominant)  Orientation  Overall Orientation Status: Within Normal Limits  Orientation Level: Oriented X4                  Education Given To: Patient;Family  Education Provided: Role of Therapy;Plan of Care;Home Exercise Program;Family Education;Precautions;ADL Adaptive Strategies  Education Provided Comments: began education on FMC ex, adaptation for decreased sensation for IADLs and ADLs for safety to prevent burns  Education Method: Demonstration;Verbal  Barriers to Learning: None  Education Outcome: Verbalized understanding;Continued education needed  LUE AROM (degrees)  LUE AROM : WNL  RUE AROM (degrees)  RUE AROM : WNL        Hand Dominance  Hand Dominance: Right  Fine Motor Skills  Fine Motor Comment: L UE FMC WNL, R UE oposition is slow and methodical, especially IF to thumb and LF to thumb, patient is able to drum fingers on R side in the air without difficulty, pt was able to grasp cup withR hand but slow and methodical  Other Assessment: L digital strength WNL,R lat pinch 25% less , Tip 25% less, tri 25% less  Hand Assessment Comment  Hand Assessment Comment: wrist flexion 4+/5, wrist extension 4+/5, gross grasp 4/5, sup/pro 4/5 R UE; L WFL                                                           AM-PAC - ADL  AM-PAC Daily Activity - Inpatient   How much help is needed for putting on and taking off regular lower body clothing?: A Little  How much help is needed for bathing (which

## 2024-01-30 NOTE — BRIEF OP NOTE
Brief Postoperative Note Lumbar Puncture    Colette Tracy  YOB: 1975  [id number]    Pre-operative Diagnosis: Paresthesias    Post-operative Diagnosis: Same    Procedure: Fluoro guided Lumbar Puncture    Anesthesia: 1% Lidocaine    Surgeons/Assistants: PATRICIO Santana MD    Complications: None    EBL: Minimal    Specimens: Obtained and sent to lab    Fluoroscopy guided lumbar puncture. 22 gauge spinal needle.  9 ml clear CSF obtained.  Dressing applied. Instructions given.    Electronically signed by PETR Mares on 1/30/2024 at 4:41 PM

## 2024-01-30 NOTE — PLAN OF CARE
Problem: Discharge Planning  Goal: Discharge to home or other facility with appropriate resources  Outcome: Progressing  Flowsheets (Taken 1/29/2024 2109)  Discharge to home or other facility with appropriate resources:   Identify barriers to discharge with patient and caregiver   Identify discharge learning needs (meds, wound care, etc)     Problem: Safety - Adult  Goal: Free from fall injury  Outcome: Progressing

## 2024-01-30 NOTE — PLAN OF CARE
Colette Tracy was evaluated today and a DME order was entered for a wheeled walker because she requires this to successfully complete daily living tasks of toileting, personal cares, ambulating, hygiene, and meal preparation.  A wheeled walker is necessary due to the patient's unsteady gait, upper body weakness, and inability to  an ambulation device; and she can ambulate only by pushing a walker instead of a lesser assistive device such as a cane, crutch, or standard walker.  The need for this equipment was discussed with the patient and she understands and is in agreement.      Neal Cottrell MD

## 2024-01-30 NOTE — CARE COORDINATION
Writer spoke with Bhavna SHEPHERD  [phone] @ [facility], notified of need for LP for today.  Awaiting call back.       13[facility] for LP. Beverley ANGELO @ [facility] notified and in agreement.

## 2024-01-30 NOTE — PROGRESS NOTES
[facility] Neurology Specialist  [address]  [place] [address]  PH:  [phone] or [phone]  FAX:  [phone]      Brief history: Colette Tracy is a 48 y.o. old female admitted on 1/29/2024 with right-sided numbness     Subjective: No new neurological events overnight. Patient mentions progression of the numbness since the onset     Objective: BP 97/64   Pulse 59   Temp 97.7 °F (36.5 °C)   Resp 18   Ht 1.575 m (5' 2\")   Wt 84.8 kg (187 lb)   LMP 12/13/2021 (Approximate)   SpO2 99%   BMI 34.20 kg/m²       Medications:    sodium chloride flush  5-40 mL IntraVENous 2 times per day    enoxaparin  40 mg SubCUTAneous Daily    [Held by provider] aspirin  81 mg Oral Daily    atorvastatin  40 mg Oral Nightly        General examination:    Head: Normocephalic, atraumatic  Eyes: Extraocular movements intact  Lungs: Respirations unlabored, chest wall no deformity  ENT: Normal external ear canals, no sinus tenderness  Heart: Regular rate rhythm  Abdomen: No masses, tenderness  Extremities: No cyanosis or edema, 2+ pulses  Skin: Intact, normal skin color    Neurological examination:    Mental status   Alert and oriented; intact memory with no confusion, speech or language problems; no hallucinations or delusions     Cranial nerves   II - visual fields intact to confrontation                                                III, IV, VI - extra-ocular muscles full: no pupillary defect; no CHARANJIT, no nystagmus, no ptosis   V - normal facial sensation                                                               VII - normal facial symmetry                                                             VIII - intact hearing                                                                             IX, X - symmetrical palate                                                                  XI - symmetrical shoulder shrug                                                       XII - midline tongue  on the right side with limping and walking with assistance using a cane.  She feels that the right side is as if like Novocain after dental surgery.  On 1/26 she was not able to  things with right hand due to weakness and could not go to work.  Over the weekend she noted slurred speech/jumbled words.  She feels that the numbness is progressing on to the entire right side.  She feels colder on the right side.  She does not feel any difference in water temperature while showering.  No facial involvement.  Normal bowel and bladder movements.  But feels normal when she wipes her perineal region.  Recent travel to [place] from 1/12 to 1/15/2024 where she got sunburn on the right lateral calf and thigh along with left lower calf.  No bulbar symptoms.  On 1/23/2024 she was evaluated at [facility].  Pan CT spine did not show any abnormalities.  Her symptoms were attributed to weight loss,Qsymia, that she had been taking for 2 months but stopped taking when she went on the vacation.  She was then seen in [facility] on 1/24/2024 for same symptoms.  CT of the head was negative and patient preferred to be discharged home with reference to outpatient neurology for possible MRI scans and EMG and discharged with prednisone 50 mg daily for 5 days with no improvement.  Saw her PCP on 1/26 and finally admitted on 1/29/2024 for worsening of symptoms.  MRI cervical spine concerning for hyperintensity in the dorsal cord at C2 region with contrast enhancement concerning for transverse myelitis/demyelinating disease with neural foraminal narrowing in the C3-4 through C5-6 region.    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

## 2024-01-30 NOTE — PROGRESS NOTES
Pt refusing lovenox. Educated pt on the medication and importance, continues to refuse. Pt also refusing oral potassium.  She states she had not eaten all day. After labs were drawn she states she ate dinner. Pt requesting to wait until tomorrow to see what her potassium level is.

## 2024-01-30 NOTE — PLAN OF CARE
Problem: Discharge Planning  Goal: Discharge to home or other facility with appropriate resources  1/30/2024 1537 by Zoey Gunn, RN  Outcome: Progressing  Flowsheets (Taken 1/30/2024 0750)  Discharge to home or other facility with appropriate resources: Identify barriers to discharge with patient and caregiver  1/30/2024 0313 by Zollinger, Sheri, RN  Outcome: Progressing  Flowsheets (Taken 1/29/2024 2109)  Discharge to home or other facility with appropriate resources:   Identify barriers to discharge with patient and caregiver   Identify discharge learning needs (meds, wound care, etc)     Problem: Safety - Adult  Goal: Free from fall injury  1/30/2024 1537 by Zoey Gunn, RN  Outcome: Progressing  1/30/2024 0313 by Zollinger, Sheri, RN  Outcome: Progressing

## 2024-01-31 LAB
CASE NUMBER:: NORMAL
CRYPTOC AG CSF QL: NEGATIVE
SURGICAL PATHOLOGY REPORT: NORMAL

## 2024-01-31 PROCEDURE — 2580000003 HC RX 258: Performed by: NURSE PRACTITIONER

## 2024-01-31 PROCEDURE — 97110 THERAPEUTIC EXERCISES: CPT

## 2024-01-31 PROCEDURE — 2580000003 HC RX 258: Performed by: STUDENT IN AN ORGANIZED HEALTH CARE EDUCATION/TRAINING PROGRAM

## 2024-01-31 PROCEDURE — 6360000004 HC RX CONTRAST MEDICATION: Performed by: PSYCHIATRY & NEUROLOGY

## 2024-01-31 PROCEDURE — 97116 GAIT TRAINING THERAPY: CPT

## 2024-01-31 PROCEDURE — 36415 COLL VENOUS BLD VENIPUNCTURE: CPT

## 2024-01-31 PROCEDURE — A4216 STERILE WATER/SALINE, 10 ML: HCPCS | Performed by: PSYCHIATRY & NEUROLOGY

## 2024-01-31 PROCEDURE — 6360000002 HC RX W HCPCS: Performed by: PSYCHIATRY & NEUROLOGY

## 2024-01-31 PROCEDURE — 82306 VITAMIN D 25 HYDROXY: CPT

## 2024-01-31 PROCEDURE — APPSS30 APP SPLIT SHARED TIME 16-30 MINUTES

## 2024-01-31 PROCEDURE — C9113 INJ PANTOPRAZOLE SODIUM, VIA: HCPCS | Performed by: PSYCHIATRY & NEUROLOGY

## 2024-01-31 PROCEDURE — 99233 SBSQ HOSP IP/OBS HIGH 50: CPT | Performed by: PSYCHIATRY & NEUROLOGY

## 2024-01-31 PROCEDURE — 2580000003 HC RX 258: Performed by: PSYCHIATRY & NEUROLOGY

## 2024-01-31 PROCEDURE — 97535 SELF CARE MNGMENT TRAINING: CPT

## 2024-01-31 PROCEDURE — 99232 SBSQ HOSP IP/OBS MODERATE 35: CPT | Performed by: HOSPITALIST

## 2024-01-31 PROCEDURE — 1200000000 HC SEMI PRIVATE

## 2024-01-31 RX ADMIN — SODIUM CHLORIDE, PRESERVATIVE FREE 10 ML: 5 INJECTION INTRAVENOUS at 08:44

## 2024-01-31 RX ADMIN — SODIUM CHLORIDE, PRESERVATIVE FREE 10 ML: 5 INJECTION INTRAVENOUS at 00:00

## 2024-01-31 RX ADMIN — IOPAMIDOL 100 ML: 755 INJECTION, SOLUTION INTRAVENOUS at 00:00

## 2024-01-31 RX ADMIN — SODIUM CHLORIDE, PRESERVATIVE FREE 10 ML: 5 INJECTION INTRAVENOUS at 22:40

## 2024-01-31 RX ADMIN — SODIUM CHLORIDE 80 ML: 9 INJECTION, SOLUTION INTRAVENOUS at 00:00

## 2024-01-31 RX ADMIN — PANTOPRAZOLE SODIUM 40 MG: 40 INJECTION, POWDER, FOR SOLUTION INTRAVENOUS at 08:43

## 2024-01-31 RX ADMIN — SODIUM CHLORIDE 1000 MG: 9 INJECTION, SOLUTION INTRAVENOUS at 08:50

## 2024-01-31 NOTE — PLAN OF CARE
Problem: Discharge Planning  Goal: Discharge to home or other facility with appropriate resources  1/31/2024 0147 by Zollinger, Sheri, RN  Outcome: Progressing     Problem: Safety - Adult  Goal: Free from fall injury  1/31/2024 0147 by Zollinger, Sheri, RN  Outcome: Progressing

## 2024-01-31 NOTE — PROGRESS NOTES
Physical Therapy  Facility/Department: [address]  Physical Therapy Daily Documentation Note    Name: Colette Tracy  : 1975  MRN: [id number]  Date of Service: 2024    Discharge Recommendations:  Patient would benefit from continued therapy after discharge   PT Equipment Recommendations  Equipment Needed: Yes  Walker: Rolling      Patient Diagnosis(es): The primary encounter diagnosis was Radiculopathy, unspecified spinal region. Diagnoses of Numbness and Paresthesias were also pertinent to this visit.  Past Medical History:  has a past medical history of Major depressive disorder, single episode, mild (HCC).  Past Surgical History:  has a past surgical history that includes laparoscopy (2006); ovarian cyst removal (); Dilation & curettage (, ); Colonoscopy (01/15/2021); Colonoscopy (N/A, 01/15/2021); and Thyroidectomy, partial (Right, 2022).    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 She lives with  and 4 children in 2 story home with basement. She currently has numbness R side of body at level of R shoulder and distal but reports less in R foot and hand today.  She is modified indep for bed mobility and transfers with RW but supervision for gait 50ft with RW versus SBA 200ft with RW. She has ambulated 15 ft without device with SBA but antalgic gait pattern with fall risk at PT eval. She is able to go up/down platform step with RW and up/down steps with L rail (both hands on L rail) and CGA. Pt is safe to return home, but will need rolling walker and will benefit from continued PT. She will benefit from acute PT to increase safety, balance and independence with gait and stair

## 2024-01-31 NOTE — PROGRESS NOTES
Pt refusing to receive Protonix and Solumedrol tonight. She states, \"I have issues taking new medications at night.\" \"I have a phobia about allergic reactions and not being able to wake up in the morning to see my kids.\" \"I am allergic to Pepcid and I do not want to have a reaction overnight.\"  Reassurance given, reminded her writer would check on her often and if there were issues, writer would address quickly. Pt continues to refuse but states she will take during the day tomorrow.

## 2024-01-31 NOTE — PROGRESS NOTES
Occupational Therapy  Facility/Department: [address]  Occupational Therapy Daily Treatment Note    Name: Colette Tracy  : 1975  MRN: [id number]  Date of Service: 2024    Discharge Recommendations:  Patient would benefit from continued therapy after discharge        Patient Diagnosis(es): The primary encounter diagnosis was Radiculopathy, unspecified spinal region. Diagnoses of Numbness and Paresthesias were also pertinent to this visit.  Past Medical History:  has a past medical history of Major depressive disorder, single episode, mild (HCC).  Past Surgical History:  has a past surgical history that includes laparoscopy (2006); ovarian cyst removal (); Dilation & curettage (, ); Colonoscopy (01/15/2021); Colonoscopy (N/A, 01/15/2021); and Thyroidectomy, partial (Right, 2022).       Assessment   Performance deficits / Impairments: Decreased functional mobility ;Decreased ADL status;Decreased sensation;Decreased ROM;Decreased balance;Decreased strength;Decreased fine motor control;Decreased coordination  Assessment: Pt with decreased ADL status secondary to above noted deficits, most significantly decreased sensation and coordination to RUE which is pt's dominant hand. Pt engaged in various FMC and strengthening tasks at tray table requiring increased time/effort throughout. Pt to benefit from continued therapy services while hospitalized and at discharge to maximize pt's safety and independence in performing functional tasks.  Prognosis: Good  Decision Making: Medium Complexity  REQUIRES OT FOLLOW-UP: Yes  Activity Tolerance  Activity Tolerance: Patient Tolerated treatment well      Safety Devices  Type of Devices: Call light within reach;Nurse notified;Left in bed  Restraints  Restraints Initially in Place: No    Plan   Occupational Therapy Plan  Times Per Week: 5-6x/wk  Current Treatment Recommendations: Strengthening, Balance training, Safety education & training,  46.65  ADL Inpatient CMS G-Code Modifier : CK    Goals  Short Term Goals  Time Frame for Short Term Goals: 14 days pt will  Short Term Goal 1: Indep with Home FMC ex to increase skill for fasteners to Indep without difficulties and self feed especially hand to mouth and cutting to Indep without cues to use R hand  Short Term Goal 2: Improve  and arm and release without difficulty strength R side such that patient can lift items and place back down Indep without difficulty  Short Term Goal 3: Complete ADL transfers Indep and safe without difficulty  Short Term Goal 4: Pt demonstrate compensatory strategies For R UE numbness indep without cues  Short Term Goal 5: Complete Functional Mobility and walker use during ADLs Indep without cues  Patient Goals   Patient goals : home with spouse and get feeling back       Therapy Time   Individual Concurrent Group Co-treatment   Time In 1128         Time Out 1211         Minutes 43         Timed Code Treatment Minutes: 43 Minutes       Tami Fall, VILMAR/L

## 2024-01-31 NOTE — PROGRESS NOTES
[facility]  Office: [phone]  Luke Hollingsworth DO, Edmundo Beckford DO, Maksim Green DO, Russ Cedillo DO, Jamila Watt MD, Liliam Meehan MD, Juan Luis Cruz MD, Eve Cole MD,  Tu Robles MD, Deon Membreno MD, Sukumar Flood MD,  Chelsey Neves DO, Andre Frost MD, Scott Izaguirre MD, Cuauhtemoc Hollingsworth DO, Jaimie Martinez MD,  Yhair Jernigan DO, Lesly Saldana MD, Breanne Lara MD, Caitlin Harrell MD, Allison Salmeron MD,  Andrew Du MD, Chirag Saez MD, Giovanni Guillen MD, Juwan Dan MD, Neal Cottrell MD, Wojciech Tolbert MD, Mario Avery DO, Jaswant Walsh DO, Mahin Wynn MD,  Antony Maldonado MD, Shirley Waterhouse, CNP,  Bharati Van, CNP, Manjit Chaudhry, CNP,  Tana Godoy, DNP, Payal Weiss, CNP, Taylor Harper, CNP, Kimberley Jensen CNP, Luzma Russ, CNP, Swapna Polanco, CNP, Su Mitchell, PA-C, Kaylene Cedeño, PA-C, Marlyn Cortes, CNP, Dina Alcantara, CNS, Namrata Call, CNP, Estephanie Raya, CNP, Tracy Schwab, CNP         [facility]   IN-PATIENT SERVICE   [facility]    Progress Note    1/31/2024    8:51 AM    Name:   Colette Tracy  MRN:     [id number]     Acct:      [id number]   Room:   [facility] Day:  2  Admit Date:  1/29/2024 12:32 PM    PCP:   Manjit Hobson MD  Code Status:  Full Code    Subjective:     Patient is a 48-year-old female who is seen in follow-up for right-sided weakness/paresthesias.  Patient underwent extensive testing yesterday, and available results were discussed with the patient.  I did discuss with the patient that I was still suspicious of a possible new onset multiple sclerosis diagnosis, however we are awaiting further results to confirm this possibility.  I also spoke with the neurologist, who states he is not fully convinced that this is an MS diagnosis yet.  He did state that he would discuss this with the patient and is also awaiting further results.  Multiple questions answered at bedside, patient was

## 2024-01-31 NOTE — PROGRESS NOTES
[facility] Neurology [facility]  [address]  [place] [address]  PH:  [phone] or [phone]  FAX:  [phone]      Brief history: Colette Tracy is a 48 y.o. old female admitted on 1/29/2024 with right-sided numbness     Subjective: No new neurological events overnight. Patient mentions progression of the numbness since the onset.  Symptomatically better today ?  Able to walk with the right side of the lower leg using a walker to the bathroom.     Objective: /72   Pulse 66   Temp 98.1 °F (36.7 °C) (Oral)   Resp 16   Ht 1.575 m (5' 2\")   Wt 84.8 kg (187 lb)   LMP 12/13/2021 (Approximate)   SpO2 100%   BMI 34.20 kg/m²       Medications:    methylPREDNISolone sodium (PF) (SOLU-MEDROL PF) 1,000 mg in sodium chloride 0.9 % 250 mL IVPB  1,000 mg IntraVENous Daily    pantoprazole (PROTONIX) 40 mg in sodium chloride (PF) 0.9 % 10 mL injection  40 mg IntraVENous Daily    sodium chloride flush  5-40 mL IntraVENous 2 times per day    [Held by provider] aspirin  81 mg Oral Daily    atorvastatin  40 mg Oral Nightly        General examination:    Head: Normocephalic, atraumatic  Eyes: Extraocular movements intact  Lungs: Respirations unlabored, chest wall no deformity  ENT: Normal external ear canals, no sinus tenderness  Heart: Regular rate rhythm  Abdomen: No masses, tenderness  Extremities: No cyanosis or edema, 2+ pulses  Skin: Intact, normal skin color    Neurological examination:    Mental status   Alert and oriented; intact memory with no confusion, speech or language problems; no hallucinations or delusions     Cranial nerves   II - visual fields intact to confrontation                                                III, IV, VI - extra-ocular muscles full: no pupillary defect; no CHARANJIT, no nystagmus, no ptosis   V - normal facial sensation                                                               VII - normal facial symmetry

## 2024-02-01 ENCOUNTER — APPOINTMENT (OUTPATIENT)
Dept: CT IMAGING | Age: 49
DRG: 099 | End: 2024-02-01
Payer: COMMERCIAL

## 2024-02-01 LAB
25(OH)D3 SERPL-MCNC: 8.6 NG/ML (ref 30–100)
ALBUMIN PERCENT: 67 % (ref 45–65)
ALBUMIN SERPL-MCNC: 4.4 G/DL (ref 3.2–5.2)
ALPHA 2 PERCENT: 10 % (ref 6–13)
ALPHA1 GLOB SERPL ELPH-MCNC: 0.2 G/DL (ref 0.1–0.4)
ALPHA1 GLOB SERPL ELPH-MCNC: 2 % (ref 3–6)
ALPHA2 GLOB SERPL ELPH-MCNC: 0.7 G/DL (ref 0.5–0.9)
ANION GAP SERPL CALCULATED.3IONS-SCNC: 8 MMOL/L (ref 9–17)
B-GLOBULIN SERPL ELPH-MCNC: 0.7 G/DL (ref 0.5–1.1)
B-GLOBULIN SERPL ELPH-MCNC: 11 % (ref 11–19)
BUN SERPL-MCNC: 8 MG/DL (ref 6–20)
CALCIUM SERPL-MCNC: 8.9 MG/DL (ref 8.6–10.4)
CHLORIDE SERPL-SCNC: 108 MMOL/L (ref 98–107)
CO2 SERPL-SCNC: 26 MMOL/L (ref 20–31)
COPPER SERPL-MCNC: 83.7 UG/DL (ref 80–155)
CREAT SERPL-MCNC: 0.6 MG/DL (ref 0.5–0.9)
DEPRECATED S PNEUM 1 IGG SER-MCNC: 0 AU/ML (ref 0–19)
GAMMA GLOB SERPL ELPH-MCNC: 0.7 G/DL (ref 0.5–1.5)
GAMMA GLOBULIN %: 10 % (ref 9–20)
GFR SERPL CREATININE-BSD FRML MDRD: >60 ML/MIN/1.73M2
GLUCOSE SERPL-MCNC: 145 MG/DL (ref 70–99)
IGG SERPL-MCNC: 849 MG/DL (ref 700–1600)
LYME ANTIBODY: 0.17
PATHOLOGIST: ABNORMAL
POTASSIUM SERPL-SCNC: 4.3 MMOL/L (ref 3.7–5.3)
PROT PATTERN SERPL ELPH-IMP: ABNORMAL
PROT SERPL-MCNC: 6.6 G/DL (ref 6.6–8.7)
SEND OUT REPORT: NORMAL
SODIUM SERPL-SCNC: 142 MMOL/L (ref 135–144)
TEST NAME: NORMAL
TOTAL PROT. SUM,%: 100 % (ref 98–102)
TOTAL PROT. SUM: 6.7 G/DL (ref 6.3–8.2)

## 2024-02-01 PROCEDURE — 80048 BASIC METABOLIC PNL TOTAL CA: CPT

## 2024-02-01 PROCEDURE — 99232 SBSQ HOSP IP/OBS MODERATE 35: CPT | Performed by: HOSPITALIST

## 2024-02-01 PROCEDURE — 86480 TB TEST CELL IMMUN MEASURE: CPT

## 2024-02-01 PROCEDURE — 1200000000 HC SEMI PRIVATE

## 2024-02-01 PROCEDURE — 71260 CT THORAX DX C+: CPT

## 2024-02-01 PROCEDURE — 6370000000 HC RX 637 (ALT 250 FOR IP): Performed by: HOSPITALIST

## 2024-02-01 PROCEDURE — 99233 SBSQ HOSP IP/OBS HIGH 50: CPT | Performed by: PSYCHIATRY & NEUROLOGY

## 2024-02-01 PROCEDURE — APPSS30 APP SPLIT SHARED TIME 16-30 MINUTES

## 2024-02-01 PROCEDURE — 6360000002 HC RX W HCPCS: Performed by: PSYCHIATRY & NEUROLOGY

## 2024-02-01 PROCEDURE — 97116 GAIT TRAINING THERAPY: CPT

## 2024-02-01 PROCEDURE — 2580000003 HC RX 258: Performed by: PSYCHIATRY & NEUROLOGY

## 2024-02-01 PROCEDURE — 2580000003 HC RX 258: Performed by: STUDENT IN AN ORGANIZED HEALTH CARE EDUCATION/TRAINING PROGRAM

## 2024-02-01 PROCEDURE — 97110 THERAPEUTIC EXERCISES: CPT

## 2024-02-01 PROCEDURE — 36415 COLL VENOUS BLD VENIPUNCTURE: CPT

## 2024-02-01 PROCEDURE — 6360000004 HC RX CONTRAST MEDICATION: Performed by: PSYCHIATRY & NEUROLOGY

## 2024-02-01 RX ORDER — SODIUM CHLORIDE 0.9 % (FLUSH) 0.9 %
10 SYRINGE (ML) INJECTION PRN
Status: COMPLETED | OUTPATIENT
Start: 2024-02-01 | End: 2024-02-01

## 2024-02-01 RX ORDER — 0.9 % SODIUM CHLORIDE 0.9 %
80 INTRAVENOUS SOLUTION INTRAVENOUS ONCE
Status: COMPLETED | OUTPATIENT
Start: 2024-02-01 | End: 2024-02-01

## 2024-02-01 RX ORDER — METHYLPREDNISOLONE SODIUM SUCCINATE 500 MG/8ML
1000 INJECTION INTRAMUSCULAR; INTRAVENOUS DAILY
Qty: 4 EACH | Refills: 0 | Status: SHIPPED | OUTPATIENT
Start: 2024-02-01 | End: 2024-02-03

## 2024-02-01 RX ORDER — ERGOCALCIFEROL 1.25 MG/1
50000 CAPSULE ORAL WEEKLY
Status: DISCONTINUED | OUTPATIENT
Start: 2024-02-01 | End: 2024-02-02 | Stop reason: HOSPADM

## 2024-02-01 RX ADMIN — SODIUM CHLORIDE, PRESERVATIVE FREE 10 ML: 5 INJECTION INTRAVENOUS at 22:41

## 2024-02-01 RX ADMIN — SODIUM CHLORIDE, PRESERVATIVE FREE 10 ML: 5 INJECTION INTRAVENOUS at 10:47

## 2024-02-01 RX ADMIN — IOPAMIDOL 75 ML: 755 INJECTION, SOLUTION INTRAVENOUS at 22:07

## 2024-02-01 RX ADMIN — SODIUM CHLORIDE 1000 MG: 9 INJECTION, SOLUTION INTRAVENOUS at 10:47

## 2024-02-01 RX ADMIN — SODIUM CHLORIDE, PRESERVATIVE FREE 10 ML: 5 INJECTION INTRAVENOUS at 22:07

## 2024-02-01 RX ADMIN — ERGOCALCIFEROL 50000 UNITS: 1.25 CAPSULE ORAL at 09:41

## 2024-02-01 RX ADMIN — SODIUM CHLORIDE 80 ML: 9 INJECTION, SOLUTION INTRAVENOUS at 22:07

## 2024-02-01 NOTE — CARE COORDINATION
Writer spoke Xochitl @ [facility] that can do a 900am Feb 3 & 430 infusion .    1315 Writer spoke with patient @ bedside she is agreeable to IV infusion therapy at [facility] on Feb 3 & 4th @ 9:00    1445 Writer spoke will Xochitl to confirm infusion she will have             Tammy call me .

## 2024-02-01 NOTE — PROGRESS NOTES
Occupational Therapy  Facility/Department: [address]  Occupational Therapy Daily Treatment Note     Name: Colette Tracy  : 1975  MRN: [id number]  Date of Service: 2024    Discharge Recommendations:  Patient would benefit from continued therapy after discharge          Patient Diagnosis(es): The primary encounter diagnosis was Radiculopathy, unspecified spinal region. Diagnoses of Numbness and Paresthesias were also pertinent to this visit.  Past Medical History:  has a past medical history of Major depressive disorder, single episode, mild (HCC).  Past Surgical History:  has a past surgical history that includes laparoscopy (2006); ovarian cyst removal (); Dilation & curettage (, ); Colonoscopy (01/15/2021); Colonoscopy (N/A, 01/15/2021); and Thyroidectomy, partial (Right, 2022).           Assessment   Performance deficits / Impairments: Decreased functional mobility ;Decreased ADL status;Decreased sensation;Decreased ROM;Decreased balance;Decreased strength;Decreased fine motor control;Decreased coordination  Assessment: Pt with decreased ADL status secondary to above noted deficits, most significantly decreased sensation and coordination to RUE which is pt's dominant hand. Pt engaged in various FMC and strengthening tasks at tray table requiring increased time/effort throughout. Pt to benefit from continued therapy services while hospitalized and at discharge to maximize pt's safety and independence in performing functional tasks.  Prognosis: Good  REQUIRES OT FOLLOW-UP: Yes  Activity Tolerance  Activity Tolerance: Patient Tolerated treatment well        Plan   Occupational Therapy Plan  Times Per Week: 5-6x/wk  Current Treatment Recommendations: Strengthening, Balance training, Safety education & training, Patient/Caregiver education & training, Equipment evaluation, education, & procurement, Self-Care / ADL, Home management training, Sensory integration, Functional mobility

## 2024-02-01 NOTE — PLAN OF CARE
Problem: Discharge Planning  Goal: Discharge to home or other facility with appropriate resources  2/1/2024 0135 by Zollinger, Sheri, RN  Outcome: Progressing     Problem: Safety - Adult  Goal: Free from fall injury  2/1/2024 0135 by Zollinger, Sheri, RN  Outcome: Progressing

## 2024-02-01 NOTE — PROGRESS NOTES
Physical Therapy  Facility/Department: [address]  Physical Therapy Daily Documentation Note    Name: Colette Tracy  : 1975  MRN: [id number]  Date of Service: 2024    Discharge Recommendations:  Patient would benefit from continued therapy after discharge   PT Equipment Recommendations  Equipment Needed: Yes  Walker: Rolling      Patient Diagnosis(es): The primary encounter diagnosis was Radiculopathy, unspecified spinal region. Diagnoses of Numbness and Paresthesias were also pertinent to this visit.  Past Medical History:  has a past medical history of Major depressive disorder, single episode, mild (HCC).  Past Surgical History:  has a past surgical history that includes laparoscopy (2006); ovarian cyst removal (); Dilation & curettage (, ); Colonoscopy (01/15/2021); Colonoscopy (N/A, 01/15/2021); and Thyroidectomy, partial (Right, 2022).    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 She lives with  and 4 children in 2 story home with basement. She currently has numbness R side of body at level of R shoulder and distal but reports less in R foot and hand today.  She is modified indep for bed mobility, independent transfers and gait 400ft without device and now able to go up/down platform step and up/down flight steps with L rail reciprocally.  Pt is safe to return home. She will benefit from continued challenging and monitering of dynamic balance in acute PT.  Therapy Prognosis: Good  Decision Making: Medium Complexity  Requires PT Follow-Up: Yes  Activity Tolerance  Activity Tolerance: Patient tolerated treatment well     Plan   Physical Therapy Plan  General Plan: 2-3 times per  10; min cues                                                 AM-PAC - Mobility    AM-PAC Basic Mobility - Inpatient   How much help is needed turning from your back to your side while in a flat bed without using bedrails?: None  How much help is needed moving from lying on your back to sitting on the side of a flat bed without using bedrails?: None  How much help is needed moving to and from a bed to a chair?: None  How much help is needed standing up from a chair using your arms?: None  How much help is needed walking in hospital room?: None  How much help is needed climbing 3-5 steps with a railing?: None  AM-PAC Inpatient Mobility Raw Score : 24  AM-PAC Inpatient T-Scale Score : 61.14  Mobility Inpatient CMS 0-100% Score: 0  Mobility Inpatient CMS G-Code Modifier : CH              Goals  Short Term Goals  Time Frame for Short Term Goals: 14  Short Term Goal 1: Pt will be independent bed mobility. (goal met 2/1/24)  Short Term Goal 2: Pt will be independent without device for transfers. (goal met 2/1/24)  Short Term Goal 3: Pt will ambulate 200ft with RW modified indep. (goal exceeded 2/1/24)  Short Term Goal 4: Pt will go up/down flight with R rail and modified indep. (goal met 2/1/24)  Short Term Goal 5: Pt will go up/down platform step with RW modified indep. (goal met 2/1/24)  Additional Goals?: Yes  Short Term Goal 6: Pt will demonstrate WFL dynamic balance reaching, lifting and turning in room with gait activites for 15 minutes modified indep.  Patient Goals   Patient Goals : get better and have diagnosis for her problems       Education  Patient Education  Education Given To: Patient  Education Provided: Role of Therapy;Plan of Care;Fall Prevention Strategies;Transfer Training;Equipment;Energy Conservation  Education Provided Comments: RW prn for fatigue and control numbness symptoms if fatigues  Education Method: Demonstration;Verbal;Teach Back  Barriers to Learning: None  Education Outcome: Verbalized

## 2024-02-01 NOTE — CARE COORDINATION
Case Management Assessment  Initial Evaluation    Date/Time of Evaluation: 2/1/2024 3:06 PM  Assessment Completed by: Lisa Alonso RN    If patient is discharged prior to next notation, then this note serves as note for discharge by case management.    Patient Name: Colette Tracy                   YOB: 1975  Diagnosis: Numbness [R20.0]  Paresthesias [R20.2]  Radiculopathy, unspecified spinal region [M54.10]  Right sided numbness [R20.0]                   Date / Time: 1/29/2024 12:32 PM    Patient Admission Status: Inpatient   Readmission Risk (Low < 19, Mod (19-27), High > 27): Readmission Risk Score: 7.6    Current PCP: Manjit Hobson MD  PCP verified by CM? Yes    Chart Reviewed: Yes      History Provided by: Patient  Patient Orientation: Alert and Oriented    Patient Cognition: Alert    Hospitalization in the last 30 days (Readmission):  No    If yes, Readmission Assessment in CM Navigator will be completed.    Advance Directives:      Code Status: Full Code   Patient's Primary Decision Maker is:        Discharge Planning:    Patient lives with: Spouse/Significant Other, Children Type of Home: House  Primary Care Giver: Self  Patient Support Systems include: Spouse/Significant Other, Children   Current Financial resources:    Current community resources:    Current services prior to admission: None            Current DME:              Type of Home Care services:  None    ADLS  Prior functional level: Independent in ADLs/IADLs  Current functional level: Independent in ADLs/IADLs    PT AM-PAC: 24 /24  OT AM-PAC: 18 /24    Family can provide assistance at DC: Yes  Would you like Case Management to discuss the discharge plan with any other family members/significant others, and if so, who? No  Plans to Return to Present Housing: Yes  Other Identified Issues/Barriers to RETURNING to current housing: none  Potential Assistance needed at discharge: N/A            Potential DME:    Patient

## 2024-02-01 NOTE — PROGRESS NOTES
[facility] Neurology Specialist  [address]  [place] [address]  PH:  [phone] or [phone]  FAX:  [phone]            Brief history: Colette Tracy is a 48 y.o. old female admitted on 1/29/2024 with likely demyelinating disease     Subjective: No new neurological events overnight. Patient denies any new weakness, numbness, tingling or headache.  She feels symptomatically better since yesterday.     Objective: /63   Pulse 70   Temp 97.1 °F (36.2 °C) (Temporal)   Resp 16   Ht 1.575 m (5' 2\")   Wt 84.8 kg (187 lb)   LMP 12/13/2021 (Approximate)   SpO2 97%   BMI 34.20 kg/m²       Medications:    vitamin D  50,000 Units Oral Weekly    methylPREDNISolone sodium (SOLU-MEDROL) 1,000 mg in sodium chloride 0.9 % 250 mL IVPB  1,000 mg IntraVENous Daily    methylPREDNISolone sodium (PF) (SOLU-MEDROL PF) 1,000 mg in sodium chloride 0.9 % 250 mL IVPB  1,000 mg IntraVENous Daily    pantoprazole (PROTONIX) 40 mg in sodium chloride (PF) 0.9 % 10 mL injection  40 mg IntraVENous Daily    sodium chloride flush  5-40 mL IntraVENous 2 times per day    [Held by provider] aspirin  81 mg Oral Daily    atorvastatin  40 mg Oral Nightly        General examination:    Head: Normocephalic, atraumatic  Eyes: Extraocular movements intact  Lungs: Respirations unlabored, chest wall no deformity  ENT: Normal external ear canals, no sinus tenderness  Heart: Regular rate rhythm  Abdomen: No masses, tenderness  Extremities: No cyanosis or edema, 2+ pulses  Skin: Intact, normal skin color    Neurological examination:    Mental status   Alert and oriented; intact memory with no confusion, speech or language problems; no hallucinations or delusions     Cranial nerves   II - visual fields intact to confrontation                                                III, IV, VI - extra-ocular muscles full: no pupillary defect; no CHARANJIT, no nystagmus, no ptosis   V - normal facial sensation                            1/23/2024 she was evaluated at [facility].  Pan CT spine did not show any abnormalities.  Her symptoms were attributed to weight loss,Qsymia, that she had been taking for 2 months but stopped taking when she went on the vacation.  She was then seen in [facility] on 1/24/2024 for same symptoms.  CT of the head was negative and patient preferred to be discharged home with reference to outpatient neurology for possible MRI scans and EMG and discharged with prednisone 50 mg daily for 5 days with no improvement.  Saw her PCP on 1/26 and finally admitted on 1/29/2024 for worsening of symptoms.  MRI cervical spine concerning for hyperintensity in the dorsal cord at C2 region with contrast enhancement concerning for transverse myelitis/demyelinating disease with neural foraminal narrowing in the C3-4 through C5-6 region.  MRI brain shows nonspecific FLAIR abnormality in the periventricular white matter region in the right temporal lobe.     CSF: Protein, differential count, cryptococcal antigen, is normal.  Meningitis panel, T palladium is negative.  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

## 2024-02-01 NOTE — PLAN OF CARE
Problem: Discharge Planning  Goal: Discharge to home or other facility with appropriate resources  2/1/2024 1322 by Reyna Hernandez, RN  Outcome: /[facility] Progressing  2/1/2024 0135 by Zollinger, Sheri, RN  Outcome: Progressing     Problem: Safety - Adult  Goal: Free from fall injury  2/1/2024 1322 by Reyna Hernandez, RN  Outcome: /[facility] Progressing  Note: Safety goals reviewed with pt. Instructed pt to continue to call before getting out of bed and to utilize assistive devices. Pt verbalizes understanding. Non-slip socks applied to pt, bed in lowest position, bedside table & call light within reach, and bed alarms active. Will continue to monitor progress.  2/1/2024 0135 by Zollinger, Sheri, RN  Outcome: Progressing

## 2024-02-01 NOTE — PROGRESS NOTES
[facility]  Office: [phone]  Luke Hollingsworth DO, Edmundo Beckford DO, Maksim Green DO, Russ Cedillo DO, Jamila Watt MD, Liliam Meehan MD, Juan Luis Cruz MD, Eve Cole MD,  Tu Robles MD, Deon Membreno MD, Sukumar Flood MD,  Chelsey Neves DO, Andre Frost MD, Scott Izaguirre MD, Cuauhtemoc Hollingsworth DO, Jaimie Martinez MD,  Yahir Jernigan DO, Lesly Saldana MD, Breanne Lara MD, Caitlin Harrell MD, Allison Salmeron MD,  Andrew Du MD, Chirag Saez MD, Giovanni Guillen MD, Juwan Dan MD, Neal Cottrell MD, Wojciech Tolbert MD, Mario Avery DO, Jaswant Walsh DO, Mahin Wynn MD,  Antony Maldonado MD, Shirley Waterhouse, CNP,  Bharati Van, CNP, Manjit Chaudhry, CNP,  Tana Godoy, TANVI, Payal Weiss, CNP, Taylor Harper, CNP, Kimberley Jensen CNP, Luzma Russ, CNP, Swapna Polanco, CNP, Su Mitchell, PA-C, Kaylene Cedeño PA-C, Marlyn Cortes, CNP, Dina Alcantara, CNS, Namrata Call, CNP, Estephanie Raya, CNP, Tracy Schwab, CNP         [facility]   IN-PATIENT SERVICE   [facility]    Progress Note    2/1/2024    8:23 AM    Name:   Colette Tracy  MRN:     [id number]     Acct:      [id number]   Room:   310/310-01   Day:  3  Admit Date:  1/29/2024 12:32 PM    PCP:   Manjit Hobson MD  Code Status:  Full Code    Subjective:     Patient is a 48-year-old female who is seen in follow-up for numbness and weakness of her right side.  Patient states that she is feeling better, states that she has improving sensation on her right side, more so in her lower extremity than her upper extremity.  I discussed with the patient that her vitamin D level was low, and we would be starting vitamin D supplementation for her.  She was appreciative of this information.  Denies fevers, chills, chest pain, shortness of breath, nausea, vomiting, new or worsening numbness or weakness overnight.  No additional questions or concerns for provider at this time.    Medications:  of the right temporal lobe.  Differential considerations include a post infectious/inflammatory process, vasculitis, or demyelinating process. Follow-up pre and post-contrast MRI of the brain is recommended in 6 months to assess stability.     MRI LUMBAR PLEXUS WO CONTRAST    Result Date: 1/29/2024  No acute abnormality of the right sacral plexus identified.     MRI CERVICAL SPINE W WO CONTRAST    Result Date: 1/29/2024  1. There is a focus of T2 hyperintensity within the dorsal cord at C2 with associated contrast enhancement.  This may represent transverse myelitis or perhaps an area of demyelination.  A cord neoplasm is felt to be less likely given the lack of extensive surrounding edema. 2. Degenerative changes contribute to moderate spinal canal stenosis at C4-C5 mild-to-moderate at C3-C4 and mild stenosis at C5-C6 as well as C6-C7. 3. Neural foraminal narrowing at C3-C4 through C5-C6 as above. 4. Loss of disc space height with endplate irregularity as well as Modic type 1 degenerative endplate changes at C3-C4.     XR CHEST PORTABLE    Result Date: 1/29/2024  No acute cardiopulmonary disease.       Physical Examination:     General appearance:  alert, cooperative and no distress  Mental Status:  oriented to person, place and time and normal affect  Lungs:  clear to auscultation bilaterally, normal effort  Heart:  regular rate and rhythm, no murmur  Abdomen:  soft, nontender, nondistended, normal bowel sounds, no masses, hepatomegaly, splenomegaly  Extremities:  no edema, redness, tenderness in the calves  Skin:  no gross lesions, rashes, induration    Assessment:     Hospital Problems             Last Modified POA    * (Principal) Numbness 1/29/2024 Yes    Paresthesias 1/29/2024 Yes    Radiculopathy 1/30/2024 Yes    Abnormal MRI, cervical spine 1/30/2024 Yes    Transverse myelitis (HCC) 1/30/2024 Yes       Plan:     Right-sided numbness/paresthesias  Continue IV Solu-Medrol  PT/OT  Discussed laboratory and

## 2024-02-02 VITALS
HEIGHT: 62 IN | SYSTOLIC BLOOD PRESSURE: 115 MMHG | WEIGHT: 218.7 LBS | OXYGEN SATURATION: 95 % | DIASTOLIC BLOOD PRESSURE: 72 MMHG | HEART RATE: 70 BPM | BODY MASS INDEX: 40.25 KG/M2 | RESPIRATION RATE: 18 BRPM | TEMPERATURE: 97.9 F

## 2024-02-02 LAB
ANA SER QL IA: NEGATIVE
ANCA MYELOPEROXIDASE: <0.3 AU/ML (ref 0–3.5)
ANCA PROTEINASE 3: <0.7 AU/ML (ref 0–2)
CSF ISOELECTRIC FOCUSING INTERPRETATION: ABNORMAL
DSDNA IGG SER QL IA: <0.5 IU/ML
ENA SS-A IGG SER QL: <0.3 U/ML
ENA SS-B IGG SER IA-ACNC: <0.3 U/ML
MBP CSF-MCNC: 11 NG/ML (ref 0–5.5)
MICROORGANISM SPEC CULT: NORMAL
MICROORGANISM/AGENT SPEC: NORMAL
NUCLEAR IGG SER IA-RTO: 0.1 U/ML
OLIGOCLONAL BANDS CSF IEF: 4 BANDS (ref 0–1)
OLIGOCLONAL BANDS: POSITIVE
P E INTERPRETATION, U: NORMAL
PATHOLOGIST: NORMAL
SPECIMEN DESCRIPTION: NORMAL
SPECIMEN TYPE: NORMAL
URINE TOTAL PROTEIN: 13 MG/DL
VDRL CSF QL: NONREACTIVE

## 2024-02-02 PROCEDURE — 99232 SBSQ HOSP IP/OBS MODERATE 35: CPT | Performed by: HOSPITALIST

## 2024-02-02 PROCEDURE — APPSS30 APP SPLIT SHARED TIME 16-30 MINUTES

## 2024-02-02 PROCEDURE — 6360000002 HC RX W HCPCS: Performed by: PSYCHIATRY & NEUROLOGY

## 2024-02-02 PROCEDURE — 99233 SBSQ HOSP IP/OBS HIGH 50: CPT | Performed by: PSYCHIATRY & NEUROLOGY

## 2024-02-02 PROCEDURE — 2580000003 HC RX 258: Performed by: NURSE PRACTITIONER

## 2024-02-02 PROCEDURE — 2580000003 HC RX 258: Performed by: PSYCHIATRY & NEUROLOGY

## 2024-02-02 RX ORDER — PREDNISONE 10 MG/1
10 TABLET ORAL DAILY
Qty: 3 TABLET | Refills: 0 | Status: SHIPPED | OUTPATIENT
Start: 2024-02-02 | End: 2024-02-02 | Stop reason: HOSPADM

## 2024-02-02 RX ORDER — SODIUM CHLORIDE 9 MG/ML
5-250 INJECTION, SOLUTION INTRAVENOUS PRN
Status: CANCELLED | OUTPATIENT
Start: 2024-02-03

## 2024-02-02 RX ORDER — ERGOCALCIFEROL 1.25 MG/1
50000 CAPSULE ORAL WEEKLY
Qty: 5 CAPSULE | Refills: 0 | Status: SHIPPED | OUTPATIENT
Start: 2024-02-08 | End: 2024-03-08

## 2024-02-02 RX ORDER — PREDNISONE 5 MG/1
5 TABLET ORAL DAILY
Qty: 3 TABLET | Refills: 0 | Status: SHIPPED | OUTPATIENT
Start: 2024-02-02 | End: 2024-02-02 | Stop reason: HOSPADM

## 2024-02-02 RX ORDER — ATORVASTATIN CALCIUM 40 MG/1
40 TABLET, FILM COATED ORAL NIGHTLY
Qty: 30 TABLET | Refills: 3 | Status: SHIPPED | OUTPATIENT
Start: 2024-02-02

## 2024-02-02 RX ORDER — SODIUM CHLORIDE 0.9 % (FLUSH) 0.9 %
5-40 SYRINGE (ML) INJECTION PRN
Status: CANCELLED | OUTPATIENT
Start: 2024-02-03

## 2024-02-02 RX ORDER — PREDNISONE 20 MG/1
40 TABLET ORAL DAILY
Qty: 6 TABLET | Refills: 0 | Status: SHIPPED | OUTPATIENT
Start: 2024-02-02 | End: 2024-02-02 | Stop reason: HOSPADM

## 2024-02-02 RX ORDER — HEPARIN 100 UNIT/ML
500 SYRINGE INTRAVENOUS PRN
Status: CANCELLED | OUTPATIENT
Start: 2024-02-03

## 2024-02-02 RX ORDER — PREDNISONE 20 MG/1
20 TABLET ORAL DAILY
Qty: 3 TABLET | Refills: 0 | Status: SHIPPED | OUTPATIENT
Start: 2024-02-02 | End: 2024-02-02 | Stop reason: HOSPADM

## 2024-02-02 RX ADMIN — SODIUM CHLORIDE 1000 MG: 9 INJECTION, SOLUTION INTRAVENOUS at 09:01

## 2024-02-02 RX ADMIN — SODIUM CHLORIDE, PRESERVATIVE FREE 10 ML: 5 INJECTION INTRAVENOUS at 09:01

## 2024-02-02 NOTE — PLAN OF CARE
Problem: Discharge Planning  Goal: Discharge to home or other facility with appropriate resources  Outcome: Adequate for Discharge  Flowsheets (Taken 2/2/2024 0805)  Discharge to home or other facility with appropriate resources: Identify barriers to discharge with patient and caregiver     Problem: Safety - Adult  Goal: Free from fall injury  Outcome: Adequate for Discharge

## 2024-02-02 NOTE — DISCHARGE SUMMARY
[facility]  Office: [phone]  Luke Hollingsworth DO, Edmundo Beckford DO, Maksim Green DO, Russ Cedillo DO, Jamila Watt MD, Liliam Meehan MD, Juan Luis Cruz MD, Eve Cole MD,  Tu Robles MD, Deon Membreno MD, Sukumar Flood MD,  Chelsey Neves DO, Andre Frost MD, Scott Izaguirre MD, Cuauhtemoc Hollingsworth DO, Jaimie Martinez MD,  Yahir Jernigan DO, Lesly Saldana MD, Breanne Lara MD, Caitlin Harrell MD, Allison Salmeron MD,  Andrew Du MD, Chirag Saez MD, Giovanni Guillen MD, Juwan Dan MD, Neal Cottrell MD, Wojciech Tolbert MD, Mario Avery DO, Jaswant Walsh DO, Mahin Wynn MD,  Antony Maldonado MD, Shirley Waterhouse, CNP,  Bharati Van, CNP, Manjit Chaudhry, CNP,  Tana Godoy, DNP, Payal Weiss, CNP, Taylor Harper, CNP, Kimberley Jensen, CNP, Luzma Russ, CNP, Swapna Polanco, CNP, Su Mitchell PA-C, RASHAD StarrC, Marlyn Cortes, CNP, Dina Alcantara, CNS, Namrata Call, CNP, Estephanie Raya, CNP, Tracy Schwab, CNP         [facility]   IN-PATIENT SERVICE   [facility]    Discharge Summary     Patient ID: Colette Tracy  :  1975   MRN: [id number]     ACCOUNT:  [id number]   Patient's PCP: Manjit Hobson MD  Admit Date: 2024   Discharge Date: 2024  Length of Stay: 4  Code Status:  Full Code  Admitting Physician: Juwan Dan MD  Discharge Physician: PETR Goel     Active Discharge Diagnoses:     Hospital Problem Lists:  Principal Problem:    Numbness  Active Problems:    Paresthesias    Radiculopathy    Abnormal MRI, cervical spine    Transverse myelitis (HCC)  Resolved Problems:    * No resolved hospital problems. *      Admission Condition:  fair     Discharged Condition: good    Hospital Stay:     Hospital Course:  Colette Tracy is a 48 y.o. female who was admitted for the management of Numbness , presented to ER with Numbness (Pt here with persistent numbness, has been seen twice for this in the  \"BJM9LYA\", \"BXIT5FRL\", \"M6SHPINW\", \"O2SAT\", \"FIO2\"  Lab Results   Component Value Date/Time    SPECIAL NOT REPORTED 05/22/2012 08:24 PM     Lab Results   Component Value Date/Time    CULTURE NO GROWTH 3 DAYS 01/30/2024 04:47 PM       Radiology:  CT CHEST ABDOMEN PELVIS W CONTRAST Additional Contrast? None    Result Date: 2/2/2024  1. No acute cardiopulmonary process. 2. No acute intra-abdominal or pelvic abnormality. 3. Small areas of sclerosis seen in the left sacrum and right iliac bone. These are nonspecific and likely related to small bone islands. In the setting of a known primary malignancy, metastatic disease would be difficult to exclude. Comparison to any outside remote studies would be beneficial as these may represent chronic stable findings.  No destructive bone lesions. 4. No definite finding to suggest malignancy within the chest, abdomen or pelvis. 5. 3.9 cm simple appearing left ovarian cyst.  No further follow-up necessary, reference below. 6. Colonic diverticulosis. 7. Small infraumbilical omental fat hernia without bowel herniation. 8. Right thyroidectomy. RECOMMENDATIONS: 3.9 cm left ovarian simple-appearing cyst. No follow-up imaging is recommended. Reference: JACR 2020 Feb;17(2):248-254     CTA HEAD NECK W CONTRAST    Result Date: 1/31/2024  No acute intracranial abnormality visualized. No flow limiting stenosis or large vessel occlusion detected within the head or neck. Air-fluid level within the right maxillary antrum, which could indicate acute sinusitis.     CT HEAD WO CONTRAST    Result Date: 1/31/2024  No acute intracranial abnormality visualized. No flow limiting stenosis or large vessel occlusion detected within the head or neck. Air-fluid level within the right maxillary antrum, which could indicate acute sinusitis.     IR LUMBAR PUNCTURE FOR DIAGNOSIS    Result Date: 1/30/2024  Successful fluoroscopic-guided lumbar puncture.     MRI THORACIC SPINE W WO CONTRAST    Result Date:

## 2024-02-02 NOTE — PROGRESS NOTES
[facility]  [address]  [place] [address]  PH:  [phone] or [phone]  FAX:  [phone]            Brief history: Colette Tracy is a 48 y.o. old female admitted on 1/29/2024 with transverse myelitis likely demyelinating disease     Subjective: No new neurological events overnight. Patient denies any new weakness, numbness, tingling or headache.  She feels symptomatically better since yesterday.  The numbness improved by 80% and she is not yet there but close to baseline.  Able to walk and do her daily ADLs.     Objective: /72   Pulse 70   Temp 97.9 °F (36.6 °C) (Oral)   Resp 18   Ht 1.575 m (5' 2\")   Wt 99.2 kg (218 lb 11.1 oz)   LMP 12/13/2021 (Approximate)   SpO2 95%   BMI 40.00 kg/m²       Medications:          General examination:    Head: Normocephalic, atraumatic  Eyes: Extraocular movements intact  Lungs: Respirations unlabored, chest wall no deformity  ENT: Normal external ear canals, no sinus tenderness  Heart: Regular rate rhythm  Abdomen: No masses, tenderness  Extremities: No cyanosis or edema, 2+ pulses  Skin: Intact, normal skin color    Neurological examination:    Mental status   Alert and oriented; intact memory with no confusion, speech or language problems; no hallucinations or delusions     Cranial nerves   II - visual fields intact to confrontation                                                III, IV, VI - extra-ocular muscles full: no pupillary defect; no CHARANJIT, no nystagmus, no ptosis   V - normal facial sensation                                                               VII - normal facial symmetry                                                             VIII - intact hearing                                                                             IX, X - symmetrical palate                                                                  XI - symmetrical shoulder shrug                                             abnormality visualized. No flow limiting stenosis or large vessel occlusion detected within the head or neck. Air-fluid level within the right maxillary antrum, which could indicate acute  sinusitis     MRI L-SPINE/PLEXUS W/WO (1/29/2024): No acute abnormality of R sacral plexus identified     CT chest abdomen pelvis (2/1/2024): 3.9 cm left ovarian simple appearing cyst.  No definitive evidence of malignancy.  Small areas of sclerosis in the left sacrum and right iliac bone which are nonspecific.  Metastatic disease cannot be ruled out.    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  On 1/21 she was cracking her neck when she heard crackling sounds.  She was taking a shower on 1/22 and felt new onset of numbness in the right perineal region which progressed to the right hip, buttocks, progressed onto the thighs and waist.  Next day She felt numbness in the right foot and up to the rib cage front and back.  On 1/24 right palm, right armpit and right breast felt numb and noticed she was not able to walk on the right side with limping and walking with assistance using a cane.  She feels that the right side is as if like Novocain after dental surgery.  On 1/26 she was not able to  things with right hand due to weakness and could not go to work.  Over the weekend she noted slurred speech/jumbled words.  She feels that the numbness is progressing on to the entire right side.  She feels colder on the right side.  She does not feel any difference in water temperature while showering.  No facial involvement.  Normal bowel and bladder movements.  But feels normal when she wipes her perineal region.  Recent travel to [place] from 1/12 to 1/15/2024 where she got sunburn on the right lateral calf and thigh along with left lower calf.  No bulbar symptoms.  On 1/23/2024 she

## 2024-02-02 NOTE — PROGRESS NOTES
Patient alert and oriented x 4. Able to make needs known. Discharged instruction given with no further questions. Staff discharged patient via wheelchair with family.

## 2024-02-02 NOTE — DISCHARGE INSTR - COC
Continuity of Care Form    Patient Name: Colette Tracy   :  1975  MRN:  [id number]    Admit date:  2024  Discharge date:  ***    Code Status Order: Full Code   Advance Directives:     Admitting Physician:  Juwan Dan MD  PCP: Manjit Hobson MD    Discharging Nurse: ***  Discharging Hospital Unit/Room#: 310/310-01  Discharging Unit Phone Number: ***    Emergency Contact:   Extended Emergency Contact Information  Primary Emergency Contact: Hollis Tracy  Address: [address] [place] [address]  Home Phone: [phone]  Relation: Spouse  Secondary Emergency Contact: hollis tracy  Mobile Phone: [phone]  Relation: Spouse    Past Surgical History:  Past Surgical History:   Procedure Laterality Date    COLONOSCOPY  01/15/2021    COLONOSCOPY N/A 01/15/2021    COLORECTAL CANCER SCREENING, NOT HIGH RISK performed by Zohaib Mullen IV, DO at [facility] OR    DILATION AND CURETTAGE  ,     SAB    LAPAROSCOPY  2006    lt. ov cyst     OVARIAN CYST REMOVAL      THYROIDECTOMY, PARTIAL Right 2022       Immunization History:     There is no immunization history on file for this patient.    Active Problems:  Patient Active Problem List   Diagnosis Code    SAB (spontaneous ) O03.9    Hernia, umbilical K42.9    Missed  O02.1    Recurrent pregnancy loss, antepartum condition or complication O26.20    Major depressive disorder, single episode, mild (HCC) F32.0    Generalized anxiety disorder F41.1    Numbness R20.0    Paresthesias R20.2    Radiculopathy M54.10    Abnormal MRI, cervical spine R93.7    Transverse myelitis (HCC) G37.3       Isolation/Infection:   Isolation            No Isolation          Patient Infection Status       None to display                     Nurse Assessment:  Last Vital Signs: /72   Pulse 70   Temp 97.9 °F (36.6 °C) (Oral)   Resp 18   Ht 1.575 m (5' 2\")   Wt 99.2 kg (218 lb 11.1  information only, NOT a DME order):  {EQUIPMENT:634531221}  Other Treatments: ***    Patient's personal belongings (please select all that are sent with patient):  {CHP DME Belongings:818653611}    RN SIGNATURE:  {Esignature:136949056}    CASE MANAGEMENT/SOCIAL WORK SECTION    Inpatient Status Date: ***    Readmission Risk Assessment Score:  Readmission Risk              Risk of Unplanned Readmission:  7           Discharging to Facility/ Agency   Name:   Address:  Phone:  Fax:    Dialysis Facility (if applicable)   Name:  Address:  Dialysis Schedule:  Phone:  Fax:    / signature: {Esignature:827331775}    PHYSICIAN SECTION    Prognosis: {Prognosis:6172216611}    Condition at Discharge: { Patient Condition:161260741}    Rehab Potential (if transferring to Rehab): {Prognosis:0592677064}    Recommended Labs or Other Treatments After Discharge: ***    Physician Certification: I certify the above information and transfer of Colette Tracy  is necessary for the continuing treatment of the diagnosis listed and that she requires {Admit to Appropriate Level of Care:56863} for {GREATER/LESS:268629807} 30 days.     Update Admission H&P: {CHP DME Changes in HandP:359050079}    PHYSICIAN SIGNATURE:  {Esignature:746194516}

## 2024-02-02 NOTE — PROGRESS NOTES
[facility]  Office: [phone]  Luke Hollingsworth DO, Edmundo Beckford DO, Maksim Green DO, Russ Cedillo DO, Jamila Watt MD, Liliam Meehan MD, Juan Luis Cruz MD, Eve Cole MD,  Tu Robles MD, Deon Membreno MD, Sukumar Flood MD,  Chelsey Neves DO, Andre Frost MD, Scott Izaguirre MD, Cuauhtemoc Hollingsworth DO, Jaimie Martinez MD,  Yahir Jernigan DO, Lesly Saldana MD, Breanne Lara MD, Caitlin Harrell MD, Allison Salmeron MD,  Andrew Du MD, Chirag Saez MD, Giovanni Guillen MD, Juwan Dan MD, Neal Cottrell MD, Wojciech Tolbert MD, Mario Avery DO, Jaswant Walsh DO, Mahin Wynn MD,  Antony Maldonado MD, Shirley Waterhouse, CNP,  Bharati Van, CNP, Manjit Chaudhry, CNP,  Tana Godoy, DNP, Payal Weiss, CNP, Taylor Harper, CNP, Kimberley Jensen CNP, Luzma Russ, CNP, Swapna Polanco, CNP, Su Mitchell, PA-C, Kaylene Cedeño PA-C, Marlyn Cortes, CNP, Dina Alcantara, CNS, Namrata Call, CNP, Estephanie Raya, CNP, Tracy Schwab, CNP         [facility]   IN-PATIENT SERVICE   [facility]    Progress Note    2/2/2024    8:57 AM    Name:   Colette Tracy  MRN:     [id number]     Acct:      [id number]   Room:   [facility] Day:  4  Admit Date:  1/29/2024 12:32 PM    PCP:   Manjit Hobson MD  Code Status:  Full Code    Subjective:     Patient is a 48-year-old female who is seen in follow-up for right-sided weakness and numbness.  Patient states that she has continued to have improvement after receiving steroids.  She denies any acute events overnight.  Discussed with the patient the plan is for continued IV steroids as an outpatient, to which she states she already had her appointments for tomorrow and Sunday scheduled.  No further questions or concerns to the provider at this time.    Medications:     Allergies:    Allergies   Allergen Reactions    Bactrim Hives    Sulfa Antibiotics Hives    Famotidine     Sulfamethoxazole-Trimethoprim Hives       Current

## 2024-02-03 ENCOUNTER — HOSPITAL ENCOUNTER (OUTPATIENT)
Dept: OBSERVATION | Age: 49
Discharge: HOME | End: 2024-02-03
Attending: HOSPITALIST | Admitting: HOSPITALIST
Payer: COMMERCIAL

## 2024-02-03 VITALS
HEART RATE: 58 BPM | SYSTOLIC BLOOD PRESSURE: 106 MMHG | RESPIRATION RATE: 16 BRPM | DIASTOLIC BLOOD PRESSURE: 68 MMHG | TEMPERATURE: 97.2 F | OXYGEN SATURATION: 99 %

## 2024-02-03 DIAGNOSIS — G37.3 TRANSVERSE MYELITIS (HCC): Primary | ICD-10-CM

## 2024-02-03 LAB
B BURGDOR AB CSF IA-ACNC: 0.2 IV
QUANTI TB GOLD PLUS: NEGATIVE
QUANTI TB1 MINUS NIL: 0 IU/ML (ref 0–0.34)
QUANTI TB2 MINUS NIL: 0 IU/ML (ref 0–0.34)
QUANTIFERON MITOGEN: 2.86 IU/ML
QUANTIFERON NIL: 0.01 IU/ML

## 2024-02-03 PROCEDURE — 6360000002 HC RX W HCPCS: Performed by: HOSPITALIST

## 2024-02-03 PROCEDURE — 2580000003 HC RX 258: Performed by: HOSPITALIST

## 2024-02-03 PROCEDURE — 96365 THER/PROPH/DIAG IV INF INIT: CPT

## 2024-02-03 RX ORDER — SODIUM CHLORIDE 9 MG/ML
5-250 INJECTION, SOLUTION INTRAVENOUS PRN
OUTPATIENT
Start: 2024-02-04

## 2024-02-03 RX ORDER — SODIUM CHLORIDE 0.9 % (FLUSH) 0.9 %
5-40 SYRINGE (ML) INJECTION PRN
OUTPATIENT
Start: 2024-02-04

## 2024-02-03 RX ORDER — SODIUM CHLORIDE 9 MG/ML
5-250 INJECTION, SOLUTION INTRAVENOUS PRN
Status: DISCONTINUED | OUTPATIENT
Start: 2024-02-03 | End: 2024-02-03 | Stop reason: HOSPADM

## 2024-02-03 RX ORDER — SODIUM CHLORIDE 9 MG/ML
5-250 INJECTION, SOLUTION INTRAVENOUS PRN
Status: CANCELLED | OUTPATIENT
Start: 2024-02-04

## 2024-02-03 RX ORDER — HEPARIN 100 UNIT/ML
500 SYRINGE INTRAVENOUS PRN
OUTPATIENT
Start: 2024-02-04

## 2024-02-03 RX ADMIN — METHYLPREDNISOLONE SODIUM SUCCINATE 500 MG: 500 INJECTION INTRAMUSCULAR; INTRAVENOUS at 09:48

## 2024-02-03 NOTE — PROGRESS NOTES
Patient arrived to infusion center for IV steroid medication. Patient tolerated IV placement well. IVPB medication administered without complication. Patient IV left in place for second infusion tomorrow. Vitals charted per flow sheets. Patient ambulated off unit with all belongings.

## 2024-02-04 ENCOUNTER — HOSPITAL ENCOUNTER (OUTPATIENT)
Dept: OBSERVATION | Age: 49
Discharge: HOME | End: 2024-02-04
Attending: HOSPITALIST | Admitting: HOSPITALIST
Payer: COMMERCIAL

## 2024-02-04 VITALS
OXYGEN SATURATION: 99 % | SYSTOLIC BLOOD PRESSURE: 109 MMHG | RESPIRATION RATE: 18 BRPM | TEMPERATURE: 98.1 F | DIASTOLIC BLOOD PRESSURE: 67 MMHG | HEART RATE: 82 BPM

## 2024-02-04 DIAGNOSIS — G37.3 TRANSVERSE MYELITIS (HCC): Primary | ICD-10-CM

## 2024-02-04 PROCEDURE — 6360000002 HC RX W HCPCS: Performed by: HOSPITALIST

## 2024-02-04 PROCEDURE — 96365 THER/PROPH/DIAG IV INF INIT: CPT

## 2024-02-04 PROCEDURE — 2580000003 HC RX 258: Performed by: HOSPITALIST

## 2024-02-04 RX ORDER — SODIUM CHLORIDE 9 MG/ML
5-250 INJECTION, SOLUTION INTRAVENOUS PRN
OUTPATIENT
Start: 2024-02-05

## 2024-02-04 RX ORDER — HEPARIN 100 UNIT/ML
500 SYRINGE INTRAVENOUS PRN
OUTPATIENT
Start: 2024-02-05

## 2024-02-04 RX ORDER — SODIUM CHLORIDE 0.9 % (FLUSH) 0.9 %
5-40 SYRINGE (ML) INJECTION PRN
OUTPATIENT
Start: 2024-02-05

## 2024-02-04 RX ORDER — SODIUM CHLORIDE 9 MG/ML
5-250 INJECTION, SOLUTION INTRAVENOUS PRN
Status: DISCONTINUED | OUTPATIENT
Start: 2024-02-04 | End: 2024-02-04 | Stop reason: HOSPADM

## 2024-02-04 RX ADMIN — METHYLPREDNISOLONE SODIUM SUCCINATE 500 MG: 500 INJECTION INTRAMUSCULAR; INTRAVENOUS at 09:02

## 2024-02-04 NOTE — PROGRESS NOTES
Patient arrived to infusion center for IVPB steroid infusion. Patient vitals taken, see flow sheets. Patient IV flushed and patient tolerated well. IVPB medication administered without complication. IV removed after therapy completion. Patient left unit with all belongings.

## 2024-02-05 ENCOUNTER — TELEPHONE (OUTPATIENT)
Dept: NEUROLOGY | Age: 49
End: 2024-02-05

## 2024-02-05 NOTE — TELEPHONE ENCOUNTER
Colette called the office this morning and left a message on the clinical VM asking for a return call.  Writer returned the call to the patient.  Colette stated that at her hospital discharge Dr. Lowe told her that once he received all of her test results he would decide on her treatment.  Patient states that she can see in [facility] that all the results are back.  She has a hospital follow up with Dr. Lowe 3/7/24 but doesn't want to wait for four weeks and is requesting any information the doctor can give.  Patient stated \"I can't sit on this couch for four weeks\".  Writer advised that Dr. Lowe was out of the office until tomorrow, however I would forward this message for him to address on his return.  Patient verbally stated her understanding.

## 2024-02-06 LAB
SEND OUT REPORT: NORMAL
TEST NAME: NORMAL

## 2024-02-09 LAB
SEND OUT REPORT: NORMAL
TEST NAME: NORMAL

## 2024-02-26 NOTE — TELEPHONE ENCOUNTER
Call placed to the patient.  Writer confirmed with patient that she did speak with Dr. Lowe last week.  She has a follow up scheduled for 3/7/24.

## 2024-03-26 ENCOUNTER — TELEMEDICINE (OUTPATIENT)
Dept: NEUROLOGY | Age: 49
End: 2024-03-26
Payer: COMMERCIAL

## 2024-03-26 DIAGNOSIS — G37.9 CNS DEMYELINATION (HCC): ICD-10-CM

## 2024-03-26 DIAGNOSIS — R20.2 PARESTHESIAS: Primary | ICD-10-CM

## 2024-03-26 PROCEDURE — 99215 OFFICE O/P EST HI 40 MIN: CPT | Performed by: PSYCHIATRY & NEUROLOGY

## 2024-03-26 PROCEDURE — G8427 DOCREV CUR MEDS BY ELIG CLIN: HCPCS | Performed by: PSYCHIATRY & NEUROLOGY

## 2024-03-26 PROCEDURE — 4004F PT TOBACCO SCREEN RCVD TLK: CPT | Performed by: PSYCHIATRY & NEUROLOGY

## 2024-03-26 PROCEDURE — G8484 FLU IMMUNIZE NO ADMIN: HCPCS | Performed by: PSYCHIATRY & NEUROLOGY

## 2024-03-26 PROCEDURE — G8417 CALC BMI ABV UP PARAM F/U: HCPCS | Performed by: PSYCHIATRY & NEUROLOGY

## 2024-03-26 RX ORDER — GABAPENTIN 100 MG/1
100 CAPSULE ORAL 3 TIMES DAILY
Qty: 270 CAPSULE | Refills: 1 | Status: SHIPPED | OUTPATIENT
Start: 2024-03-26 | End: 2024-09-22

## 2024-04-04 ENCOUNTER — TELEPHONE (OUTPATIENT)
Dept: NEUROLOGY | Age: 49
End: 2024-04-04

## 2024-04-04 NOTE — TELEPHONE ENCOUNTER
Colette called the office this morning.  She stated that yesterday at her appointment Dr. Lowe gave her some names of possible medications she could use.  He suggested that she look in to them and call today with which one she would like to try.  Colette stated that after looking into the medications and possible side effects she is not comfortable with any of them at this time.  Colette stated that she will do a little more research and call the office if she changes her mind.

## 2024-04-16 RX ORDER — ACETAMINOPHEN 500 MG
1000 TABLET ORAL EVERY 6 HOURS PRN
COMMUNITY

## 2024-04-16 NOTE — PROGRESS NOTES
Preoperative Instructions:    Stop eating solid foods at midnight the night prior to your surgery.     Stop drinking clear liquids at midnight the night prior to your surgery.    Arrive at the surgery center (3rd entrance) on _______________ by _______________.     Please stop any blood thinning medications as directed by your surgeon or prescribing physician. Failure to stop certain medications may interfere with your scheduled surgery. These may include: Aspirin, Coumadin, Plavix, NSAIDS (Motrin, Aleve, Advil, Mobic, Celebrex), Eliquis, Pradaxa, Xarelto, Fish oil, and herbal supplements.     You may continue the rest of your medications through the night before surgery unless instructed otherwise.     Day of surgery please take only the following medication(s) with a small sip of water:      Please use and bring inhalers the day of surgery.      Reminders:  -If you are going home the day of your procedure, you will need a family member or friend to stay during the procedure and drive you home after your procedure. Your  must be 18 years of age or older and able to sign off on your discharge instructions.    -If you are going home the same day of your surgery, someone must remain with you for the first 24 hours after your surgery if you receive sedation or anesthesia.     -Please do not wear any jewelry, lotions, contacts  or body piercing the day of surgery

## 2024-04-17 ENCOUNTER — HOSPITAL ENCOUNTER (OUTPATIENT)
Dept: MRI IMAGING | Age: 49
Discharge: HOME | End: 2024-04-19
Attending: PSYCHIATRY & NEUROLOGY
Payer: COMMERCIAL

## 2024-04-17 DIAGNOSIS — R20.2 PARESTHESIAS: ICD-10-CM

## 2024-04-17 DIAGNOSIS — G37.9 CNS DEMYELINATION (HCC): ICD-10-CM

## 2024-04-17 PROCEDURE — A9579 GAD-BASE MR CONTRAST NOS,1ML: HCPCS | Performed by: PSYCHIATRY & NEUROLOGY

## 2024-04-17 PROCEDURE — 2580000003 HC RX 258: Performed by: PSYCHIATRY & NEUROLOGY

## 2024-04-17 PROCEDURE — 6360000004 HC RX CONTRAST MEDICATION: Performed by: PSYCHIATRY & NEUROLOGY

## 2024-04-17 PROCEDURE — 72156 MRI NECK SPINE W/O & W/DYE: CPT

## 2024-04-17 RX ORDER — SODIUM CHLORIDE 0.9 % (FLUSH) 0.9 %
10 SYRINGE (ML) INJECTION PRN
Status: DISCONTINUED | OUTPATIENT
Start: 2024-04-17 | End: 2024-04-20 | Stop reason: HOSPADM

## 2024-04-17 RX ADMIN — GADOTERIDOL 20 ML: 279.3 INJECTION, SOLUTION INTRAVENOUS at 17:59

## 2024-04-17 RX ADMIN — SODIUM CHLORIDE, PRESERVATIVE FREE 10 ML: 5 INJECTION INTRAVENOUS at 17:59

## 2024-04-18 ENCOUNTER — ANESTHESIA EVENT (OUTPATIENT)
Dept: OPERATING ROOM | Age: 49
End: 2024-04-18
Payer: COMMERCIAL

## 2024-04-18 RX ORDER — SODIUM CHLORIDE 0.9 % (FLUSH) 0.9 %
5-40 SYRINGE (ML) INJECTION EVERY 12 HOURS SCHEDULED
Status: CANCELLED | OUTPATIENT
Start: 2024-04-18

## 2024-04-18 RX ORDER — LIDOCAINE HYDROCHLORIDE 10 MG/ML
1 INJECTION, SOLUTION EPIDURAL; INFILTRATION; INTRACAUDAL; PERINEURAL
Status: CANCELLED | OUTPATIENT
Start: 2024-04-18 | End: 2024-04-19

## 2024-04-18 RX ORDER — SODIUM CHLORIDE 0.9 % (FLUSH) 0.9 %
5-40 SYRINGE (ML) INJECTION PRN
Status: CANCELLED | OUTPATIENT
Start: 2024-04-18

## 2024-04-18 RX ORDER — SODIUM CHLORIDE 9 MG/ML
INJECTION, SOLUTION INTRAVENOUS PRN
Status: CANCELLED | OUTPATIENT
Start: 2024-04-18

## 2024-04-18 RX ORDER — SODIUM CHLORIDE, SODIUM LACTATE, POTASSIUM CHLORIDE, CALCIUM CHLORIDE 600; 310; 30; 20 MG/100ML; MG/100ML; MG/100ML; MG/100ML
INJECTION, SOLUTION INTRAVENOUS CONTINUOUS
Status: CANCELLED | OUTPATIENT
Start: 2024-04-18

## 2024-04-19 ENCOUNTER — ANESTHESIA (OUTPATIENT)
Dept: OPERATING ROOM | Age: 49
End: 2024-04-19
Payer: COMMERCIAL

## 2024-04-19 ENCOUNTER — HOSPITAL ENCOUNTER (OUTPATIENT)
Age: 49
Setting detail: OUTPATIENT SURGERY
Discharge: HOME | End: 2024-04-19
Attending: SURGERY | Admitting: SURGERY
Payer: COMMERCIAL

## 2024-04-19 VITALS
TEMPERATURE: 97 F | WEIGHT: 198.41 LBS | RESPIRATION RATE: 18 BRPM | DIASTOLIC BLOOD PRESSURE: 74 MMHG | BODY MASS INDEX: 33.87 KG/M2 | SYSTOLIC BLOOD PRESSURE: 115 MMHG | OXYGEN SATURATION: 100 % | HEART RATE: 64 BPM | HEIGHT: 64 IN

## 2024-04-19 PROCEDURE — 2580000003 HC RX 258: Performed by: NURSE ANESTHETIST, CERTIFIED REGISTERED

## 2024-04-19 PROCEDURE — 2709999900 HC NON-CHARGEABLE SUPPLY: Performed by: SURGERY

## 2024-04-19 PROCEDURE — 7100000011 HC PHASE II RECOVERY - ADDTL 15 MIN: Performed by: SURGERY

## 2024-04-19 PROCEDURE — 3609027000 HC COLONOSCOPY: Performed by: SURGERY

## 2024-04-19 PROCEDURE — 3700000001 HC ADD 15 MINUTES (ANESTHESIA): Performed by: SURGERY

## 2024-04-19 PROCEDURE — 3700000000 HC ANESTHESIA ATTENDED CARE: Performed by: SURGERY

## 2024-04-19 PROCEDURE — 7100000010 HC PHASE II RECOVERY - FIRST 15 MIN: Performed by: SURGERY

## 2024-04-19 PROCEDURE — 2500000003 HC RX 250 WO HCPCS: Performed by: NURSE ANESTHETIST, CERTIFIED REGISTERED

## 2024-04-19 PROCEDURE — 6360000002 HC RX W HCPCS: Performed by: NURSE ANESTHETIST, CERTIFIED REGISTERED

## 2024-04-19 RX ORDER — SODIUM CHLORIDE 0.9 % (FLUSH) 0.9 %
5-40 SYRINGE (ML) INJECTION PRN
Status: CANCELLED | OUTPATIENT
Start: 2024-04-19

## 2024-04-19 RX ORDER — PROPOFOL 10 MG/ML
INJECTION, EMULSION INTRAVENOUS CONTINUOUS PRN
Status: DISCONTINUED | OUTPATIENT
Start: 2024-04-19 | End: 2024-04-19 | Stop reason: SDUPTHER

## 2024-04-19 RX ORDER — ONDANSETRON 2 MG/ML
4 INJECTION INTRAMUSCULAR; INTRAVENOUS
Status: CANCELLED | OUTPATIENT
Start: 2024-04-19 | End: 2024-04-20

## 2024-04-19 RX ORDER — DIPHENHYDRAMINE HYDROCHLORIDE 50 MG/ML
12.5 INJECTION INTRAMUSCULAR; INTRAVENOUS
Status: CANCELLED | OUTPATIENT
Start: 2024-04-19 | End: 2024-04-20

## 2024-04-19 RX ORDER — MEPERIDINE HYDROCHLORIDE 50 MG/ML
12.5 INJECTION INTRAMUSCULAR; INTRAVENOUS; SUBCUTANEOUS ONCE
Status: CANCELLED | OUTPATIENT
Start: 2024-04-19 | End: 2024-04-19

## 2024-04-19 RX ORDER — SODIUM CHLORIDE 9 MG/ML
INJECTION, SOLUTION INTRAVENOUS CONTINUOUS PRN
Status: DISCONTINUED | OUTPATIENT
Start: 2024-04-19 | End: 2024-04-19 | Stop reason: SDUPTHER

## 2024-04-19 RX ORDER — OXYCODONE HYDROCHLORIDE 5 MG/1
10 TABLET ORAL PRN
Status: CANCELLED | OUTPATIENT
Start: 2024-04-19 | End: 2024-04-19

## 2024-04-19 RX ORDER — NALOXONE HYDROCHLORIDE 0.4 MG/ML
INJECTION, SOLUTION INTRAMUSCULAR; INTRAVENOUS; SUBCUTANEOUS PRN
Status: CANCELLED | OUTPATIENT
Start: 2024-04-19

## 2024-04-19 RX ORDER — SODIUM CHLORIDE 9 MG/ML
INJECTION, SOLUTION INTRAVENOUS PRN
Status: CANCELLED | OUTPATIENT
Start: 2024-04-19

## 2024-04-19 RX ORDER — SODIUM CHLORIDE 0.9 % (FLUSH) 0.9 %
5-40 SYRINGE (ML) INJECTION EVERY 12 HOURS SCHEDULED
Status: CANCELLED | OUTPATIENT
Start: 2024-04-19

## 2024-04-19 RX ORDER — MORPHINE SULFATE 2 MG/ML
1 INJECTION, SOLUTION INTRAMUSCULAR; INTRAVENOUS EVERY 5 MIN PRN
Status: CANCELLED | OUTPATIENT
Start: 2024-04-19

## 2024-04-19 RX ORDER — LABETALOL HYDROCHLORIDE 5 MG/ML
10 INJECTION, SOLUTION INTRAVENOUS
Status: CANCELLED | OUTPATIENT
Start: 2024-04-19

## 2024-04-19 RX ORDER — PROPOFOL 10 MG/ML
INJECTION, EMULSION INTRAVENOUS PRN
Status: DISCONTINUED | OUTPATIENT
Start: 2024-04-19 | End: 2024-04-19 | Stop reason: SDUPTHER

## 2024-04-19 RX ORDER — METOCLOPRAMIDE HYDROCHLORIDE 5 MG/ML
10 INJECTION INTRAMUSCULAR; INTRAVENOUS
Status: CANCELLED | OUTPATIENT
Start: 2024-04-19 | End: 2024-04-20

## 2024-04-19 RX ORDER — OXYCODONE HYDROCHLORIDE 5 MG/1
5 TABLET ORAL PRN
Status: CANCELLED | OUTPATIENT
Start: 2024-04-19 | End: 2024-04-19

## 2024-04-19 RX ORDER — MIDAZOLAM HYDROCHLORIDE 2 MG/2ML
2 INJECTION, SOLUTION INTRAMUSCULAR; INTRAVENOUS
Status: CANCELLED | OUTPATIENT
Start: 2024-04-19 | End: 2024-04-20

## 2024-04-19 RX ORDER — HYDRALAZINE HYDROCHLORIDE 20 MG/ML
10 INJECTION INTRAMUSCULAR; INTRAVENOUS
Status: CANCELLED | OUTPATIENT
Start: 2024-04-19

## 2024-04-19 RX ORDER — LIDOCAINE HYDROCHLORIDE 10 MG/ML
INJECTION, SOLUTION INFILTRATION; PERINEURAL PRN
Status: DISCONTINUED | OUTPATIENT
Start: 2024-04-19 | End: 2024-04-19 | Stop reason: SDUPTHER

## 2024-04-19 RX ADMIN — SODIUM CHLORIDE: 9 INJECTION, SOLUTION INTRAVENOUS at 11:04

## 2024-04-19 RX ADMIN — PROPOFOL 50 MG: 10 INJECTION, EMULSION INTRAVENOUS at 13:13

## 2024-04-19 RX ADMIN — PROPOFOL 150 MCG/KG/MIN: 10 INJECTION, EMULSION INTRAVENOUS at 13:10

## 2024-04-19 RX ADMIN — PROPOFOL 50 MG: 10 INJECTION, EMULSION INTRAVENOUS at 13:11

## 2024-04-19 RX ADMIN — PROPOFOL 50 MG: 10 INJECTION, EMULSION INTRAVENOUS at 13:09

## 2024-04-19 RX ADMIN — LIDOCAINE HYDROCHLORIDE 40 MG: 10 INJECTION, SOLUTION INFILTRATION; PERINEURAL at 13:09

## 2024-04-19 ASSESSMENT — PAIN - FUNCTIONAL ASSESSMENT
PAIN_FUNCTIONAL_ASSESSMENT: NONE - DENIES PAIN
PAIN_FUNCTIONAL_ASSESSMENT: NONE - DENIES PAIN

## 2024-04-19 ASSESSMENT — LIFESTYLE VARIABLES: SMOKING_STATUS: 1

## 2024-04-19 NOTE — H&P
Family History   Problem Relation Age of Onset    Other Mother         NON CANCEROUS TUMOR FROM BACK \"GOITER\" 20S    Other Cancer Mother         NON CANCEROUS TUMOR REMOVED FROM THROAT X2    Other Father         SKIN CA - MOLE REMOVED FROM FACE    Cancer Maternal Grandmother         KIDNEY, PART OF KIDNEY REMOVED    Other Maternal Grandmother         METASTATIC CANCER, LUNG PRIMARY, + TOB    Colon Cancer Paternal Grandmother         DX IN HER 70S, SX ONLY    Colon Cancer Paternal Grandfather         DX AND  IN 76, LUNG CA, STOMACH CA    Diabetes Paternal Grandfather     Colon Cancer Paternal Aunt         DX 55,  AT 57    Breast Cancer Paternal Aunt         20S    Eclampsia Neg Hx     Hypertension Neg Hx     Ovarian Cancer Neg Hx      Labor Neg Hx     Spont Abortions Neg Hx     Stroke Neg Hx        Social History     Socioeconomic History    Marital status:      Spouse name: Not on file    Number of children: Not on file    Years of education: Not on file    Highest education level: Not on file   Occupational History    Not on file   Tobacco Use    Smoking status: Every Day     Current packs/day: 0.00     Average packs/day: 0.5 packs/day for 30.0 years (15.0 ttl pk-yrs)     Types: Cigarettes     Start date: 1994     Last attempt to quit: 2021     Years since quitting: 3.2    Smokeless tobacco: Never    Tobacco comments:     23: Quit: 10/01/2011, Quit off and on over the years.   Substance and Sexual Activity    Alcohol use: No    Drug use: No    Sexual activity: Yes     Partners: Male   Other Topics Concern    Not on file   Social History Narrative    Not on file     Social Determinants of Health     Financial Resource Strain: Not on file   Food Insecurity: No Food Insecurity (2024)    Hunger Vital Sign     Worried About Running Out of Food in the Last Year: Never true     Ran Out of Food in the Last Year: Never true   Transportation Needs: No Transportation Needs

## 2024-04-19 NOTE — OP NOTE
Operative Note      Patient: Colette Tracy  YOB: 1975  MRN: [id number]    Date of Procedure: 4/19/2024    Pre-Op Diagnosis Codes:     * Hemorrhage of rectum and anus [K62.5]    Post-Op Diagnosis: Same       Procedure(s):  COLONOSCOPY DIAGNOSTIC WITH EXAM UNDER ANESTHESIA    Surgeon(s):  Zohaib Mullen IV, DO    Assistant:   Resident: Hollis Nicole DO; Velma Jones DO    Anesthesia: General    Estimated Blood Loss (mL): 0ml    Complications: None    Specimens:   * No specimens in log *    Implants:  * No implants in log *      Drains: * No LDAs found *    Findings:  Infection Present At Time Of Surgery (PATOS) (choose all levels that have infection present):  No infection present  Other Findings: Mild small mouth sigmoid diverticulosis, anal papilla, tortuous sigmoid      HISTORY: The patient is a 48 y.o. year old female with history of above preop diagnosis.  Colonoscopy with possible biopsy or polypectomy has been recommended and I explained the risk, benefits, expected outcome, and alternatives to the procedure.  Risks included but are not limited to bleeding, infection, respiratory distress, hypotension, and perforation of the colon.  The patient understands and is in agreement.        PROCEDURE: The patient was given monitored anesthesia care. The patient was given oxygen by nasal cannula. A time out was performed ensuring the proper patient, procedure, position, antibiotics if needed, and acknowledging allergies and consent present in chart. An external anal exam and PATRICIA were performed noting no bleeding, masses, or strictures. The colonoscope was inserted per rectum and advanced under direct vision to the cecum, as confirmed by visualization of the ileocecal valve, with moderate difficulty due to tortuous sigmoid. Bowel prep was excellent.    Findings:  Cecum/Ascending colon: normal    Transverse colon: normal    Descending/Sigmoid colon: abnormal: mild small mouth  diverticulosis    Rectum/Anus: examined in normal and retroflexed positions and was abnormal: small anal papilla, few non-thrombosed external hemorrhoids    The colon was decompressed and the scope was removed. The patient tolerated the procedure well.     Recommendations:  10 years repeat colonoscopy, or sooner if changes in bowel habits, unexplained weight loss, rectal bleeding, or unexplained anemia.      Electronically signed by Hollis Nicole DO on 4/19/2024 at 1:48 PM

## 2024-04-19 NOTE — ANESTHESIA POSTPROCEDURE EVALUATION
Department of Anesthesiology  Postprocedure Note    Patient: Colette Tracy  MRN: [id number]  YOB: 1975  Date of evaluation: 4/19/2024    Procedure Summary       Date: 04/19/24 Room / Location: [facility] PROCEDURE ROOM / [facility]    Anesthesia Start: 1307 Anesthesia Stop: 1352    Procedure: COLONOSCOPY DIAGNOSTIC WITH EXAM UNDER ANESTHESIA (Anus) Diagnosis:       Hemorrhage of rectum and anus      (Hemorrhage of rectum and anus [K62.5])    Surgeons: Zohaib Mullen IV, DO Responsible Provider: Radha Mccartney MD    Anesthesia Type: MAC, general ASA Status: 2            Anesthesia Type: No value filed.    Key Phase I: Key Score: 10    Key Phase II: Key Score: 8    Anesthesia Post Evaluation    Patient location during evaluation: PACU  Patient participation: complete - patient participated  Level of consciousness: awake and alert  Airway patency: patent  Nausea & Vomiting: no nausea and no vomiting  Cardiovascular status: blood pressure returned to baseline  Respiratory status: acceptable and room air  Hydration status: euvolemic  Pain management: adequate and satisfactory to patient    No notable events documented.

## 2024-10-01 ENCOUNTER — OFFICE VISIT (OUTPATIENT)
Dept: NEUROLOGY | Age: 49
End: 2024-10-01
Payer: COMMERCIAL

## 2024-10-01 VITALS
BODY MASS INDEX: 35.4 KG/M2 | HEART RATE: 65 BPM | SYSTOLIC BLOOD PRESSURE: 127 MMHG | WEIGHT: 199.8 LBS | HEIGHT: 63 IN | DIASTOLIC BLOOD PRESSURE: 89 MMHG

## 2024-10-01 DIAGNOSIS — R20.2 PARESTHESIAS: ICD-10-CM

## 2024-10-01 DIAGNOSIS — M79.10 MYALGIA: ICD-10-CM

## 2024-10-01 DIAGNOSIS — G37.9 CNS DEMYELINATION (HCC): Primary | ICD-10-CM

## 2024-10-01 DIAGNOSIS — E55.9 VITAMIN D DEFICIENCY: ICD-10-CM

## 2024-10-01 DIAGNOSIS — M79.89 LEG SWELLING: ICD-10-CM

## 2024-10-01 PROCEDURE — 4004F PT TOBACCO SCREEN RCVD TLK: CPT | Performed by: PSYCHIATRY & NEUROLOGY

## 2024-10-01 PROCEDURE — G8484 FLU IMMUNIZE NO ADMIN: HCPCS | Performed by: PSYCHIATRY & NEUROLOGY

## 2024-10-01 PROCEDURE — G8417 CALC BMI ABV UP PARAM F/U: HCPCS | Performed by: PSYCHIATRY & NEUROLOGY

## 2024-10-01 PROCEDURE — G8427 DOCREV CUR MEDS BY ELIG CLIN: HCPCS | Performed by: PSYCHIATRY & NEUROLOGY

## 2024-10-01 PROCEDURE — 99214 OFFICE O/P EST MOD 30 MIN: CPT | Performed by: PSYCHIATRY & NEUROLOGY

## 2024-10-18 ENCOUNTER — HOSPITAL ENCOUNTER (OUTPATIENT)
Dept: MRI IMAGING | Age: 49
Discharge: HOME | End: 2024-10-20
Attending: PSYCHIATRY & NEUROLOGY
Payer: COMMERCIAL

## 2024-10-18 ENCOUNTER — HOSPITAL ENCOUNTER (OUTPATIENT)
Age: 49
Discharge: HOME | End: 2024-10-18
Payer: COMMERCIAL

## 2024-10-18 DIAGNOSIS — G37.9 CNS DEMYELINATION (HCC): ICD-10-CM

## 2024-10-18 DIAGNOSIS — E55.9 VITAMIN D DEFICIENCY: ICD-10-CM

## 2024-10-18 DIAGNOSIS — M79.10 MYALGIA: ICD-10-CM

## 2024-10-18 LAB
25(OH)D3 SERPL-MCNC: 37.2 NG/ML (ref 30–100)
CK SERPL-CCNC: 49 U/L (ref 26–192)

## 2024-10-18 PROCEDURE — 82550 ASSAY OF CK (CPK): CPT

## 2024-10-18 PROCEDURE — 6360000004 HC RX CONTRAST MEDICATION: Performed by: PSYCHIATRY & NEUROLOGY

## 2024-10-18 PROCEDURE — 2580000003 HC RX 258: Performed by: PSYCHIATRY & NEUROLOGY

## 2024-10-18 PROCEDURE — 82306 VITAMIN D 25 HYDROXY: CPT

## 2024-10-18 PROCEDURE — 72156 MRI NECK SPINE W/O & W/DYE: CPT

## 2024-10-18 PROCEDURE — 70553 MRI BRAIN STEM W/O & W/DYE: CPT

## 2024-10-18 PROCEDURE — A9579 GAD-BASE MR CONTRAST NOS,1ML: HCPCS | Performed by: PSYCHIATRY & NEUROLOGY

## 2024-10-18 PROCEDURE — 36415 COLL VENOUS BLD VENIPUNCTURE: CPT

## 2024-10-18 RX ORDER — SODIUM CHLORIDE 0.9 % (FLUSH) 0.9 %
10 SYRINGE (ML) INJECTION ONCE
Status: COMPLETED | OUTPATIENT
Start: 2024-10-18 | End: 2024-10-18

## 2024-10-18 RX ORDER — 0.9 % SODIUM CHLORIDE 0.9 %
40 INTRAVENOUS SOLUTION INTRAVENOUS ONCE
Status: DISCONTINUED | OUTPATIENT
Start: 2024-10-18 | End: 2024-10-18

## 2024-10-18 RX ADMIN — GADOTERIDOL 19 ML: 279.3 INJECTION, SOLUTION INTRAVENOUS at 15:18

## 2024-10-18 RX ADMIN — SODIUM CHLORIDE, PRESERVATIVE FREE 10 ML: 5 INJECTION INTRAVENOUS at 15:19

## 2024-11-01 ENCOUNTER — HOSPITAL ENCOUNTER (OUTPATIENT)
Dept: WOMENS IMAGING | Age: 49
Discharge: HOME | End: 2024-11-03
Payer: COMMERCIAL

## 2024-11-01 DIAGNOSIS — Z12.31 ENCOUNTER FOR SCREENING MAMMOGRAM FOR MALIGNANT NEOPLASM OF BREAST: ICD-10-CM

## 2024-11-01 PROCEDURE — 77063 BREAST TOMOSYNTHESIS BI: CPT

## 2024-11-12 ENCOUNTER — TELEPHONE (OUTPATIENT)
Dept: NEUROLOGY | Age: 49
End: 2024-11-12

## 2024-11-12 NOTE — TELEPHONE ENCOUNTER
Colette called the office and left a message asking for her MRI results and the next step.  Patient asked that Dr. Lowe call her.  Writer placed a call back to the patient and writer advised that I would forward her message to Dr. Lowe.

## 2025-01-30 ENCOUNTER — OFFICE VISIT (OUTPATIENT)
Dept: NEUROLOGY | Age: 50
End: 2025-01-30
Payer: COMMERCIAL

## 2025-01-30 VITALS
HEIGHT: 63 IN | WEIGHT: 206 LBS | BODY MASS INDEX: 36.5 KG/M2 | DIASTOLIC BLOOD PRESSURE: 81 MMHG | SYSTOLIC BLOOD PRESSURE: 117 MMHG | HEART RATE: 78 BPM

## 2025-01-30 DIAGNOSIS — R20.2 PARESTHESIAS: ICD-10-CM

## 2025-01-30 DIAGNOSIS — E55.9 VITAMIN D DEFICIENCY: Primary | ICD-10-CM

## 2025-01-30 DIAGNOSIS — G37.9 CNS DEMYELINATION (HCC): ICD-10-CM

## 2025-01-30 PROCEDURE — G8417 CALC BMI ABV UP PARAM F/U: HCPCS | Performed by: PSYCHIATRY & NEUROLOGY

## 2025-01-30 PROCEDURE — 99215 OFFICE O/P EST HI 40 MIN: CPT | Performed by: PSYCHIATRY & NEUROLOGY

## 2025-01-30 PROCEDURE — G8427 DOCREV CUR MEDS BY ELIG CLIN: HCPCS | Performed by: PSYCHIATRY & NEUROLOGY

## 2025-01-30 PROCEDURE — 4004F PT TOBACCO SCREEN RCVD TLK: CPT | Performed by: PSYCHIATRY & NEUROLOGY

## 2025-01-30 RX ORDER — GABAPENTIN 100 MG/1
100 CAPSULE ORAL 3 TIMES DAILY
Qty: 270 CAPSULE | Refills: 1 | Status: SHIPPED | OUTPATIENT
Start: 2025-01-30 | End: 2025-07-29

## 2025-07-28 ENCOUNTER — TELEMEDICINE (OUTPATIENT)
Dept: NEUROLOGY | Age: 50
End: 2025-07-28
Payer: COMMERCIAL

## 2025-07-28 ENCOUNTER — TELEPHONE (OUTPATIENT)
Dept: NEUROLOGY | Age: 50
End: 2025-07-28

## 2025-07-28 DIAGNOSIS — G37.9 CNS DEMYELINATION (HCC): Primary | ICD-10-CM

## 2025-07-28 DIAGNOSIS — E55.9 VITAMIN D DEFICIENCY: ICD-10-CM

## 2025-07-28 DIAGNOSIS — R20.2 PARESTHESIAS: ICD-10-CM

## 2025-07-28 PROCEDURE — 99214 OFFICE O/P EST MOD 30 MIN: CPT | Performed by: PSYCHIATRY & NEUROLOGY

## 2025-07-28 PROCEDURE — G8417 CALC BMI ABV UP PARAM F/U: HCPCS | Performed by: PSYCHIATRY & NEUROLOGY

## 2025-07-28 PROCEDURE — G8428 CUR MEDS NOT DOCUMENT: HCPCS | Performed by: PSYCHIATRY & NEUROLOGY

## 2025-07-28 PROCEDURE — 4004F PT TOBACCO SCREEN RCVD TLK: CPT | Performed by: PSYCHIATRY & NEUROLOGY

## 2025-07-28 RX ORDER — LORAZEPAM 1 MG/1
1 TABLET ORAL EVERY 8 HOURS PRN
Qty: 1 TABLET | Refills: 0 | Status: SHIPPED | OUTPATIENT
Start: 2025-07-28 | End: 2025-07-28

## 2025-07-28 RX ORDER — LORAZEPAM 1 MG/1
1 TABLET ORAL EVERY 8 HOURS PRN
Qty: 1 TABLET | Refills: 0 | Status: SHIPPED | OUTPATIENT
Start: 2025-07-28 | End: 2026-07-29

## 2025-07-28 NOTE — TELEPHONE ENCOUNTER
[facility] scheduling called stating the MRI cervical and Brain was put as STAT but thoracic was put as routine, patient states all of them were supposed to be stat. New order put in for MRI thoracic as STAT.

## 2025-07-30 DIAGNOSIS — G37.9 CNS DEMYELINATION (HCC): ICD-10-CM

## 2025-07-31 ENCOUNTER — OFFICE VISIT (OUTPATIENT)
Dept: NEUROLOGY | Age: 50
End: 2025-07-31
Payer: COMMERCIAL

## 2025-07-31 VITALS
SYSTOLIC BLOOD PRESSURE: 107 MMHG | HEART RATE: 76 BPM | HEIGHT: 63 IN | WEIGHT: 186.6 LBS | DIASTOLIC BLOOD PRESSURE: 73 MMHG | BODY MASS INDEX: 33.06 KG/M2

## 2025-07-31 DIAGNOSIS — R20.2 PARESTHESIAS: ICD-10-CM

## 2025-07-31 DIAGNOSIS — G37.9 CNS DEMYELINATION (HCC): Primary | ICD-10-CM

## 2025-07-31 DIAGNOSIS — E55.9 VITAMIN D DEFICIENCY: ICD-10-CM

## 2025-07-31 PROCEDURE — 4004F PT TOBACCO SCREEN RCVD TLK: CPT | Performed by: PSYCHIATRY & NEUROLOGY

## 2025-07-31 PROCEDURE — 99214 OFFICE O/P EST MOD 30 MIN: CPT | Performed by: PSYCHIATRY & NEUROLOGY

## 2025-07-31 PROCEDURE — G8427 DOCREV CUR MEDS BY ELIG CLIN: HCPCS | Performed by: PSYCHIATRY & NEUROLOGY

## 2025-07-31 PROCEDURE — G8417 CALC BMI ABV UP PARAM F/U: HCPCS | Performed by: PSYCHIATRY & NEUROLOGY

## 2025-07-31 RX ORDER — FOLIC ACID 1 MG/1
1 TABLET ORAL DAILY
COMMUNITY

## 2025-07-31 RX ORDER — PHENAZOPYRIDINE HYDROCHLORIDE 200 MG/1
TABLET, FILM COATED ORAL
COMMUNITY
Start: 2025-07-30

## 2025-08-01 ENCOUNTER — TELEPHONE (OUTPATIENT)
Dept: NEUROLOGY | Age: 50
End: 2025-08-01

## 2025-08-05 DIAGNOSIS — G35 MS (MULTIPLE SCLEROSIS) (HCC): Primary | ICD-10-CM

## 2025-08-07 ENCOUNTER — TELEPHONE (OUTPATIENT)
Dept: NEUROLOGY | Age: 50
End: 2025-08-07

## 2025-08-08 RX ORDER — ERGOCALCIFEROL 1.25 MG/1
50000 CAPSULE, LIQUID FILLED ORAL WEEKLY
Qty: 8 CAPSULE | Refills: 0 | Status: SHIPPED | OUTPATIENT
Start: 2025-08-08 | End: 2025-09-27

## 2025-08-11 ENCOUNTER — TELEPHONE (OUTPATIENT)
Dept: NEUROLOGY | Age: 50
End: 2025-08-11

## 2025-08-13 DIAGNOSIS — G35 MS (MULTIPLE SCLEROSIS) (HCC): ICD-10-CM

## 2025-08-15 DIAGNOSIS — G35 MS (MULTIPLE SCLEROSIS) (HCC): ICD-10-CM

## 2025-08-20 DIAGNOSIS — G35 MS (MULTIPLE SCLEROSIS) (HCC): ICD-10-CM

## 2025-09-02 ENCOUNTER — TELEPHONE (OUTPATIENT)
Dept: NEUROLOGY | Age: 50
End: 2025-09-02

## 2025-09-02 DIAGNOSIS — G35 RELAPSING REMITTING MULTIPLE SCLEROSIS (HCC): Primary | ICD-10-CM

## (undated) DEVICE — TUBING, SUCTION, 3/16" X 10', STRAIGHT: Brand: MEDLINE

## (undated) DEVICE — STRAP,POSITIONING,KNEE/BODY,FOAM,4X60": Brand: MEDLINE

## (undated) DEVICE — SYRINGE MED 50ML LUERLOCK TIP

## (undated) DEVICE — PREMIUM DRY TRAY LF: Brand: MEDLINE INDUSTRIES, INC.

## (undated) DEVICE — BASIN EMSIS 700ML GRAPHITE PLAS KID SHP GRAD

## (undated) DEVICE — 4-PORT MANIFOLD: Brand: NEPTUNE 2

## (undated) DEVICE — ADAPTER CLEANING PORPOISE CLEANING

## (undated) DEVICE — SOLUTION SCRB 4OZ 7.5% POVIDONE IOD ANTIMIC BTL

## (undated) DEVICE — PAD,SANITARY,11 IN,MAXI,W/WINGS,N-STRL: Brand: MEDLINE

## (undated) DEVICE — PERRYSBURG ENDO PACK: Brand: MEDLINE INDUSTRIES, INC.

## (undated) DEVICE — SPONGE GZ W4XL4IN RAYON POLY FILL CVR W/ NONWOVEN FAB

## (undated) DEVICE — BLANKET WRM W29.9XL79.1IN UP BODY FORC AIR MISTRAL-AIR

## (undated) DEVICE — SOLUTION PREP PAINT POV IOD FOR SKIN MUCOUS MEM

## (undated) DEVICE — Device: Brand: DEFENDO VALVE AND CONNECTOR KIT

## (undated) DEVICE — PROTECTOR ULN NRV PUR FOAM HK LOOP STRP ANATOMICALLY

## (undated) DEVICE — JELLY,LUBE,STERILE,FLIP TOP,TUBE,2-OZ: Brand: MEDLINE

## (undated) DEVICE — CONNECTOR TBNG AUX H2O JET DISP FOR OLY 160/180 SER

## (undated) DEVICE — UNDERPANTS MAT L XL SEAMLESS CLR CODE WAISTBAND KNIT

## (undated) DEVICE — ADAPTER,CATHETER/SYRINGE/LUER,STERILE: Brand: MEDLINE

## (undated) DEVICE — GLOVE ORANGE PI 7 1/2   MSG9075

## (undated) DEVICE — GOWN,AURORA,NONRNF,XL,30/CS: Brand: MEDLINE

## (undated) DEVICE — FLUFF UNDERPAD,MODERATE: Brand: WINGS

## (undated) DEVICE — MHPB GEN MINOR PACK: Brand: MEDLINE INDUSTRIES, INC.

## (undated) DEVICE — STERILE LATEX POWDER-FREE SURGICAL GLOVESWITH NITRILE COATING: Brand: PROTEXIS